# Patient Record
Sex: FEMALE | Race: WHITE | NOT HISPANIC OR LATINO | URBAN - METROPOLITAN AREA
[De-identification: names, ages, dates, MRNs, and addresses within clinical notes are randomized per-mention and may not be internally consistent; named-entity substitution may affect disease eponyms.]

---

## 2017-01-05 ENCOUNTER — EMERGENCY (EMERGENCY)
Facility: HOSPITAL | Age: 82
LOS: 1 days | Discharge: HOME | End: 2017-01-05
Admitting: EMERGENCY MEDICINE
Payer: MEDICARE

## 2017-01-05 DIAGNOSIS — R51 HEADACHE: ICD-10-CM

## 2017-01-05 PROCEDURE — 99284 EMERGENCY DEPT VISIT MOD MDM: CPT

## 2017-01-05 PROCEDURE — 70450 CT HEAD/BRAIN W/O DYE: CPT | Mod: 26

## 2017-01-05 PROCEDURE — 93010 ELECTROCARDIOGRAM REPORT: CPT

## 2017-06-24 ENCOUNTER — INPATIENT (INPATIENT)
Facility: HOSPITAL | Age: 82
LOS: 4 days | Discharge: NURSING, REHAB OR LONG-TERM CARE | End: 2017-06-29
Attending: FAMILY MEDICINE | Admitting: INTERNAL MEDICINE
Payer: MEDICARE

## 2017-06-24 VITALS
RESPIRATION RATE: 16 BRPM | TEMPERATURE: 98 F | HEIGHT: 60 IN | SYSTOLIC BLOOD PRESSURE: 101 MMHG | DIASTOLIC BLOOD PRESSURE: 51 MMHG | HEART RATE: 32 BPM | WEIGHT: 91.93 LBS | OXYGEN SATURATION: 100 %

## 2017-06-24 LAB
ALBUMIN SERPL ELPH-MCNC: 3.6 G/DL — SIGNIFICANT CHANGE UP (ref 3.3–5)
ALP SERPL-CCNC: 90 U/L — SIGNIFICANT CHANGE UP (ref 40–120)
ALT FLD-CCNC: 55 U/L — SIGNIFICANT CHANGE UP (ref 12–78)
ANION GAP SERPL CALC-SCNC: 8 MMOL/L — SIGNIFICANT CHANGE UP (ref 5–17)
AST SERPL-CCNC: 65 U/L — HIGH (ref 15–37)
BASOPHILS # BLD AUTO: 0 K/UL — SIGNIFICANT CHANGE UP (ref 0–0.2)
BASOPHILS NFR BLD AUTO: 0.3 % — SIGNIFICANT CHANGE UP (ref 0–2)
BILIRUB SERPL-MCNC: 0.6 MG/DL — SIGNIFICANT CHANGE UP (ref 0.2–1.2)
BUN SERPL-MCNC: 25 MG/DL — HIGH (ref 7–23)
CALCIUM SERPL-MCNC: 8.8 MG/DL — SIGNIFICANT CHANGE UP (ref 8.5–10.1)
CHLORIDE SERPL-SCNC: 106 MMOL/L — SIGNIFICANT CHANGE UP (ref 96–108)
CO2 SERPL-SCNC: 25 MMOL/L — SIGNIFICANT CHANGE UP (ref 22–31)
CREAT SERPL-MCNC: 1.01 MG/DL — SIGNIFICANT CHANGE UP (ref 0.5–1.3)
EOSINOPHIL # BLD AUTO: 0 K/UL — SIGNIFICANT CHANGE UP (ref 0–0.5)
EOSINOPHIL NFR BLD AUTO: 0.3 % — SIGNIFICANT CHANGE UP (ref 0–6)
GLUCOSE SERPL-MCNC: 172 MG/DL — HIGH (ref 70–99)
HCT VFR BLD CALC: 40 % — SIGNIFICANT CHANGE UP (ref 34.5–45)
HGB BLD-MCNC: 13.7 G/DL — SIGNIFICANT CHANGE UP (ref 11.5–15.5)
LYMPHOCYTES # BLD AUTO: 1.2 K/UL — SIGNIFICANT CHANGE UP (ref 1–3.3)
LYMPHOCYTES # BLD AUTO: 8.5 % — LOW (ref 13–44)
MAGNESIUM SERPL-MCNC: 2.2 MG/DL — SIGNIFICANT CHANGE UP (ref 1.6–2.6)
MCHC RBC-ENTMCNC: 31.8 PG — SIGNIFICANT CHANGE UP (ref 27–34)
MCHC RBC-ENTMCNC: 34.2 GM/DL — SIGNIFICANT CHANGE UP (ref 32–36)
MCV RBC AUTO: 93 FL — SIGNIFICANT CHANGE UP (ref 80–100)
MONOCYTES # BLD AUTO: 0.7 K/UL — SIGNIFICANT CHANGE UP (ref 0–0.9)
MONOCYTES NFR BLD AUTO: 5.3 % — SIGNIFICANT CHANGE UP (ref 2–14)
NEUTROPHILS # BLD AUTO: 11.8 K/UL — HIGH (ref 1.8–7.4)
NEUTROPHILS NFR BLD AUTO: 85.6 % — HIGH (ref 43–77)
NT-PROBNP SERPL-SCNC: 958 PG/ML — HIGH (ref 0–450)
PLATELET # BLD AUTO: 196 K/UL — SIGNIFICANT CHANGE UP (ref 150–400)
POTASSIUM SERPL-MCNC: 4.2 MMOL/L — SIGNIFICANT CHANGE UP (ref 3.5–5.3)
POTASSIUM SERPL-SCNC: 4.2 MMOL/L — SIGNIFICANT CHANGE UP (ref 3.5–5.3)
PROT SERPL-MCNC: 6.6 GM/DL — SIGNIFICANT CHANGE UP (ref 6–8.3)
RBC # BLD: 4.3 M/UL — SIGNIFICANT CHANGE UP (ref 3.8–5.2)
RBC # FLD: 12.6 % — SIGNIFICANT CHANGE UP (ref 10.3–14.5)
SODIUM SERPL-SCNC: 139 MMOL/L — SIGNIFICANT CHANGE UP (ref 135–145)
WBC # BLD: 13.8 K/UL — HIGH (ref 3.8–10.5)
WBC # FLD AUTO: 13.8 K/UL — HIGH (ref 3.8–10.5)

## 2017-06-24 PROCEDURE — 72125 CT NECK SPINE W/O DYE: CPT | Mod: 26

## 2017-06-24 PROCEDURE — 72100 X-RAY EXAM L-S SPINE 2/3 VWS: CPT | Mod: 26

## 2017-06-24 PROCEDURE — 70450 CT HEAD/BRAIN W/O DYE: CPT | Mod: 26

## 2017-06-24 PROCEDURE — 71010: CPT | Mod: 26

## 2017-06-24 PROCEDURE — 72070 X-RAY EXAM THORAC SPINE 2VWS: CPT | Mod: 26

## 2017-06-24 PROCEDURE — 93010 ELECTROCARDIOGRAM REPORT: CPT

## 2017-06-24 PROCEDURE — 99285 EMERGENCY DEPT VISIT HI MDM: CPT

## 2017-06-24 RX ORDER — ATROPINE SULFATE 0.1 MG/ML
0.5 SYRINGE (ML) INJECTION ONCE
Qty: 0 | Refills: 0 | Status: COMPLETED | OUTPATIENT
Start: 2017-06-24 | End: 2017-06-24

## 2017-06-24 RX ORDER — SODIUM CHLORIDE 9 MG/ML
500 INJECTION INTRAMUSCULAR; INTRAVENOUS; SUBCUTANEOUS ONCE
Qty: 0 | Refills: 0 | Status: COMPLETED | OUTPATIENT
Start: 2017-06-24 | End: 2017-06-24

## 2017-06-24 RX ADMIN — Medication 0.5 MILLIGRAM(S): at 22:26

## 2017-06-24 RX ADMIN — SODIUM CHLORIDE 500 MILLILITER(S): 9 INJECTION INTRAMUSCULAR; INTRAVENOUS; SUBCUTANEOUS at 21:00

## 2017-06-24 NOTE — ED PROVIDER NOTE - CARE PLAN
Principal Discharge DX:	Thoracic compression fracture, closed, initial encounter  Secondary Diagnosis:	Syncope

## 2017-06-24 NOTE — ED PROVIDER NOTE - OBJECTIVE STATEMENT
84 y/o female pt w/ hx of HTN presents to the ED s/p syncope. Pt states that she was getting ready for supper and fell down secondary to syncope as she went to turn off the stove. Pt experiences aching upper back pain due to falling onto the kitchen floor.  Pt mentions that she has left bundle block and takes Atenolol. Reports that she "feels heart beating fast sometimes." Pt has no other complaints and denies SOB and CP.

## 2017-06-24 NOTE — ED PROVIDER NOTE - MEDICAL DECISION MAKING DETAILS
Spoke with Dr. Link, states pt's wishes clear with DNR/DNI, no procedures/surgeries.  Will let Dr. Leggett know.  Attempted 0.5 mg atropine without improvement in HR.  Discussed again patient's heart block, and she still refuses intervention for this, and has signs a DNR form.  Pt's labs demonstrate elevated CK, but normal troponin, elevated BNP.  CT head and C spine WNL, but shows T3 mild anterior wedge deformity.  Will admit patient for further palliative care. Spoke with Dr. Link, states pt's wishes clear with DNR/DNI, no procedures/surgeries.  Will let Dr. Leggett know.  Attempted 0.5 mg atropine without improvement in HR.  Discussed again patient's heart block, and she still refuses intervention for this, and has signed a DNR form.  Pt's labs demonstrate elevated CK, but normal troponin, elevated BNP.  CT head and C spine WNL, but shows T3 mild anterior wedge deformity.  Will admit patient for further palliative care.

## 2017-06-24 NOTE — ED ADULT TRIAGE NOTE - CHIEF COMPLAINT QUOTE
unwitnessed syncopal episode, c/o lower back pain. Denies blood thinners, CP, SOB, dizziness at this time

## 2017-06-24 NOTE — ED PROVIDER NOTE - PROGRESS NOTE DETAILS
Slime Díaz: Pt requesting DNR. Spoke with Dr. Link, states pt's wishes clear with DNR/DNI, no procedures/surgeries.  Will let Dr. Leggett know.  Attempted 0.5 mg atropine without improvement in HR.  Discussed again patient's heart block, and she still refuses intervention for this, and has signs a DNR form.  Pt's labs demonstrate elevated CK, but normal troponin, elevated BNP.  CT head and C spine WNL, but shows T3 mild anterior wedge deformity.  Will admit patient for further palliative care.

## 2017-06-24 NOTE — ED PROVIDER NOTE - CONSTITUTIONAL, MLM
normal... Elderly white female, Well appearing, well nourished, awake, alert, oriented to person, place, time/situation and in no apparent distress.

## 2017-06-25 DIAGNOSIS — Z90.49 ACQUIRED ABSENCE OF OTHER SPECIFIED PARTS OF DIGESTIVE TRACT: Chronic | ICD-10-CM

## 2017-06-25 DIAGNOSIS — R55 SYNCOPE AND COLLAPSE: ICD-10-CM

## 2017-06-25 LAB
ANION GAP SERPL CALC-SCNC: 11 MMOL/L — SIGNIFICANT CHANGE UP (ref 5–17)
ANION GAP SERPL CALC-SCNC: 9 MMOL/L — SIGNIFICANT CHANGE UP (ref 5–17)
BASE EXCESS BLDA CALC-SCNC: -4 MMOL/L — LOW (ref -2–2)
BASE EXCESS BLDA CALC-SCNC: -7 MMOL/L — LOW (ref -2–2)
BASOPHILS # BLD AUTO: 0 K/UL — SIGNIFICANT CHANGE UP (ref 0–0.2)
BASOPHILS NFR BLD AUTO: 0.2 % — SIGNIFICANT CHANGE UP (ref 0–2)
BLOOD GAS COMMENTS ARTERIAL: SIGNIFICANT CHANGE UP
BLOOD GAS COMMENTS ARTERIAL: SIGNIFICANT CHANGE UP
BUN SERPL-MCNC: 32 MG/DL — HIGH (ref 7–23)
BUN SERPL-MCNC: 41 MG/DL — HIGH (ref 7–23)
CALCIUM SERPL-MCNC: 8.1 MG/DL — LOW (ref 8.5–10.1)
CALCIUM SERPL-MCNC: 8.5 MG/DL — SIGNIFICANT CHANGE UP (ref 8.5–10.1)
CHLORIDE SERPL-SCNC: 107 MMOL/L — SIGNIFICANT CHANGE UP (ref 96–108)
CHLORIDE SERPL-SCNC: 108 MMOL/L — SIGNIFICANT CHANGE UP (ref 96–108)
CK SERPL-CCNC: 174 U/L — SIGNIFICANT CHANGE UP (ref 26–192)
CK SERPL-CCNC: 266 U/L — HIGH (ref 26–192)
CO2 SERPL-SCNC: 21 MMOL/L — LOW (ref 22–31)
CO2 SERPL-SCNC: 25 MMOL/L — SIGNIFICANT CHANGE UP (ref 22–31)
CREAT SERPL-MCNC: 1.37 MG/DL — HIGH (ref 0.5–1.3)
CREAT SERPL-MCNC: 1.74 MG/DL — HIGH (ref 0.5–1.3)
EOSINOPHIL # BLD AUTO: 0 K/UL — SIGNIFICANT CHANGE UP (ref 0–0.5)
EOSINOPHIL NFR BLD AUTO: 0 % — SIGNIFICANT CHANGE UP (ref 0–6)
GAS PNL BLDA: SIGNIFICANT CHANGE UP
GLUCOSE SERPL-MCNC: 148 MG/DL — HIGH (ref 70–99)
GLUCOSE SERPL-MCNC: 207 MG/DL — HIGH (ref 70–99)
HCO3 BLDA-SCNC: 17 MMOL/L — LOW (ref 21–29)
HCO3 BLDA-SCNC: 20 MMOL/L — LOW (ref 21–29)
HCT VFR BLD CALC: 35.8 % — SIGNIFICANT CHANGE UP (ref 34.5–45)
HCT VFR BLD CALC: 38.2 % — SIGNIFICANT CHANGE UP (ref 34.5–45)
HGB BLD-MCNC: 12.6 G/DL — SIGNIFICANT CHANGE UP (ref 11.5–15.5)
HGB BLD-MCNC: 13.2 G/DL — SIGNIFICANT CHANGE UP (ref 11.5–15.5)
LACTATE SERPL-SCNC: 3.6 MMOL/L — HIGH (ref 0.7–2)
LYMPHOCYTES # BLD AUTO: 1.1 K/UL — SIGNIFICANT CHANGE UP (ref 1–3.3)
LYMPHOCYTES # BLD AUTO: 9.4 % — LOW (ref 13–44)
MAGNESIUM SERPL-MCNC: 2.2 MG/DL — SIGNIFICANT CHANGE UP (ref 1.6–2.6)
MCHC RBC-ENTMCNC: 32.4 PG — SIGNIFICANT CHANGE UP (ref 27–34)
MCHC RBC-ENTMCNC: 32.8 PG — SIGNIFICANT CHANGE UP (ref 27–34)
MCHC RBC-ENTMCNC: 34.7 GM/DL — SIGNIFICANT CHANGE UP (ref 32–36)
MCHC RBC-ENTMCNC: 35.3 GM/DL — SIGNIFICANT CHANGE UP (ref 32–36)
MCV RBC AUTO: 93 FL — SIGNIFICANT CHANGE UP (ref 80–100)
MCV RBC AUTO: 93.3 FL — SIGNIFICANT CHANGE UP (ref 80–100)
MONOCYTES # BLD AUTO: 0.5 K/UL — SIGNIFICANT CHANGE UP (ref 0–0.9)
MONOCYTES NFR BLD AUTO: 4.5 % — SIGNIFICANT CHANGE UP (ref 2–14)
NEUTROPHILS # BLD AUTO: 9.9 K/UL — HIGH (ref 1.8–7.4)
NEUTROPHILS NFR BLD AUTO: 85.9 % — HIGH (ref 43–77)
PCO2 BLDA: 30 MMHG — LOW (ref 32–46)
PCO2 BLDA: 36 MMHG — SIGNIFICANT CHANGE UP (ref 32–46)
PH BLDA: 7.37 — SIGNIFICANT CHANGE UP (ref 7.35–7.45)
PH BLDA: 7.37 — SIGNIFICANT CHANGE UP (ref 7.35–7.45)
PLATELET # BLD AUTO: 145 K/UL — LOW (ref 150–400)
PLATELET # BLD AUTO: 177 K/UL — SIGNIFICANT CHANGE UP (ref 150–400)
PO2 BLDA: 78 — SIGNIFICANT CHANGE UP
PO2 BLDA: 80 — SIGNIFICANT CHANGE UP
POTASSIUM SERPL-MCNC: 4.2 MMOL/L — SIGNIFICANT CHANGE UP (ref 3.5–5.3)
POTASSIUM SERPL-MCNC: 4.4 MMOL/L — SIGNIFICANT CHANGE UP (ref 3.5–5.3)
POTASSIUM SERPL-SCNC: 4.2 MMOL/L — SIGNIFICANT CHANGE UP (ref 3.5–5.3)
POTASSIUM SERPL-SCNC: 4.4 MMOL/L — SIGNIFICANT CHANGE UP (ref 3.5–5.3)
RBC # BLD: 3.85 M/UL — SIGNIFICANT CHANGE UP (ref 3.8–5.2)
RBC # BLD: 4.09 M/UL — SIGNIFICANT CHANGE UP (ref 3.8–5.2)
RBC # FLD: 12.5 % — SIGNIFICANT CHANGE UP (ref 10.3–14.5)
RBC # FLD: 12.8 % — SIGNIFICANT CHANGE UP (ref 10.3–14.5)
SAO2 % BLDA: 95 — SIGNIFICANT CHANGE UP
SAO2 % BLDA: 95 — SIGNIFICANT CHANGE UP
SODIUM SERPL-SCNC: 140 MMOL/L — SIGNIFICANT CHANGE UP (ref 135–145)
SODIUM SERPL-SCNC: 141 MMOL/L — SIGNIFICANT CHANGE UP (ref 135–145)
TROPONIN I SERPL-MCNC: 0.02 NG/ML — SIGNIFICANT CHANGE UP (ref 0.01–0.04)
WBC # BLD: 11.5 K/UL — HIGH (ref 3.8–10.5)
WBC # BLD: 12.5 K/UL — HIGH (ref 3.8–10.5)
WBC # FLD AUTO: 11.5 K/UL — HIGH (ref 3.8–10.5)
WBC # FLD AUTO: 12.5 K/UL — HIGH (ref 3.8–10.5)

## 2017-06-25 PROCEDURE — 99223 1ST HOSP IP/OBS HIGH 75: CPT | Mod: 57

## 2017-06-25 PROCEDURE — 33210 INSERT ELECTRD/PM CATH SNGL: CPT

## 2017-06-25 RX ORDER — ONDANSETRON 8 MG/1
8 TABLET, FILM COATED ORAL ONCE
Qty: 0 | Refills: 0 | Status: COMPLETED | OUTPATIENT
Start: 2017-06-25 | End: 2017-06-25

## 2017-06-25 RX ORDER — GLUCAGON INJECTION, SOLUTION 0.5 MG/.1ML
0.05 INJECTION, SOLUTION SUBCUTANEOUS
Qty: 5 | Refills: 0 | Status: DISCONTINUED | OUTPATIENT
Start: 2017-06-25 | End: 2017-06-25

## 2017-06-25 RX ORDER — GLUCAGON INJECTION, SOLUTION 0.5 MG/.1ML
5 INJECTION, SOLUTION SUBCUTANEOUS ONCE
Qty: 0 | Refills: 0 | Status: COMPLETED | OUTPATIENT
Start: 2017-06-25 | End: 2017-06-25

## 2017-06-25 RX ORDER — VANCOMYCIN HCL 1 G
500 VIAL (EA) INTRAVENOUS ONCE
Qty: 0 | Refills: 0 | Status: COMPLETED | OUTPATIENT
Start: 2017-06-25 | End: 2017-06-25

## 2017-06-25 RX ORDER — CALCIUM GLUCONATE 100 MG/ML
1 VIAL (ML) INTRAVENOUS ONCE
Qty: 1 | Refills: 0 | Status: COMPLETED | OUTPATIENT
Start: 2017-06-25 | End: 2017-06-25

## 2017-06-25 RX ORDER — SODIUM CHLORIDE 9 MG/ML
1000 INJECTION INTRAMUSCULAR; INTRAVENOUS; SUBCUTANEOUS
Qty: 0 | Refills: 0 | Status: DISCONTINUED | OUTPATIENT
Start: 2017-06-25 | End: 2017-06-27

## 2017-06-25 RX ADMIN — GLUCAGON INJECTION, SOLUTION 20.85 MG/KG/HR: 0.5 INJECTION, SOLUTION SUBCUTANEOUS at 06:47

## 2017-06-25 RX ADMIN — ONDANSETRON 108 MILLIGRAM(S): 8 TABLET, FILM COATED ORAL at 06:01

## 2017-06-25 RX ADMIN — Medication 100 MILLIGRAM(S): at 16:32

## 2017-06-25 RX ADMIN — Medication 0.5 MILLIGRAM(S): at 16:32

## 2017-06-25 RX ADMIN — SODIUM CHLORIDE 100 MILLILITER(S): 9 INJECTION INTRAMUSCULAR; INTRAVENOUS; SUBCUTANEOUS at 21:43

## 2017-06-25 RX ADMIN — GLUCAGON INJECTION, SOLUTION 5 MILLIGRAM(S): 0.5 INJECTION, SOLUTION SUBCUTANEOUS at 06:01

## 2017-06-25 RX ADMIN — Medication 200 GRAM(S): at 06:10

## 2017-06-25 NOTE — PROGRESS NOTE ADULT - SUBJECTIVE AND OBJECTIVE BOX
syncope.    85F.  lives alone.  preparing dinner and passed out while walking to stove.  in ED found to be in CHB.  given atropine.  takes atenolol.  reversal glucagon gtt + calcium gluconate.  patietnt deferred TVP.    at current time, patient asymptomatic.  readdressed option for TVP and PPM.  willing to think about it.      she's highly functioning at home.  lives alone w/ help from the community.  has 2 daughters, one in [facility] and the other lives in [place].    ROS:  (-) CP.  (-) palpitations.    frail, elderly wf.  no acute distress.  appears comfortable.  neck nml JVP.  lungs CTA.  no w/r/r.  heart regular.  NS1S2.  no m/r/g.  abd soft.  NTND.  no guarding.  no rebound.  ext no pedal edema.  A&Ox3.    MEDICATIONS  (STANDING):  glucagon Infusion 0.05mG/kG/Hr IV Continuous <Continuous>    MEDICATIONS  (PRN):    Vital Signs Last 24 Hrs  T(C): 36.6, Max: 36.7 (06-25 @ 04:27)  T(F): 97.9, Max: 98.1 (06-25 @ 04:27)  HR: 31 (25 - 71)  BP: 112/40 (101/51 - 141/57)  BP(mean): --  RR: 18 (16 - 18)  SpO2: 100% (100% - 100%)                        13.2   11.5  )-----------( 177      ( 25 Jun 2017 05:25 )             38.2       06-25    141  |  107  |  32<H>  ----------------------------<  148<H>  4.4   |  25  |  1.37<H>    Ca    8.5      25 Jun 2017 05:24  Mg     2.2     06-25    TPro  6.6  /  Alb  3.6  /  TBili  0.6  /  DBili  x   /  AST  65<H>  /  ALT  55  /  AlkPhos  90  06-24    CARDIAC MARKERS ( 25 Jun 2017 05:24 )  x     / x     / 174 U/L / x     / x      CARDIAC MARKERS ( 24 Jun 2017 23:32 )  0.016 ng/mL / x     / x     / x     / x      CARDIAC MARKERS ( 24 Jun 2017 20:48 )  <0.015 ng/mL / x     / 207 U/L / x     / x        syncope secondary to CHB.  -admit to IS.  -patient considering options but has not committed to a decision yet.  amenable to speaking w/ Cardiology and EP, re:  TVP to PPM.  -TTE.  -c/w glucagon gtt.  -d/c all AVNBs.  -Cardiology + EP consults.    renal insufficiency.  -given IVFs in ED.  -f/u AM BMP.    DOROTEO calcification.  -nonemergent chest CT recommended.      T3 vertebral body wedging.  -consider orthopedic spine follow up outpatient.     disposition.  -admit to IS.  -to be determined.

## 2017-06-25 NOTE — H&P ADULT - NSHPREVIEWOFSYSTEMS_GEN_ALL_CORE
REVIEW OF SYSTEMS:    CONSTITUTIONAL: + weakness  EYES/ENT:+ vertigo   RESPIRATORY: No cough, wheezing, hemoptysis; No shortness of breath  CARDIOVASCULAR: No chest pain or palpitations  GASTROINTESTINAL: No abdominal or epigastric pain. No nausea, vomiting, or hematemesis; No diarrhea or constipation. No melena or hematochezia.  GENITOURINARY: No dysuria, frequency or hematuria  SKIN: No itching, burning, rashes, or lesions   All other review of systems is negative unless indicated above.

## 2017-06-25 NOTE — GOALS OF CARE CONVERSATION - PERSONAL ADVANCE DIRECTIVE - CONVERSATION DETAILS
The role of Palliative medicine was reviewed. The pt discussed her concerns regarding Pacemeake placement and has decided that she will proceed based on quality of life issues. She will discuss further with her daughter at her request

## 2017-06-25 NOTE — H&P ADULT - NSHPLABSRESULTS_GEN_ALL_CORE
13.7   13.8  )-----------( 196      ( 24 Jun 2017 20:48 )             40.0     06-24    139  |  106  |  25<H>  ----------------------------<  172<H>  4.2   |  25  |  1.01    Ca    8.8      24 Jun 2017 20:48  Mg     2.2     06-24    TPro  6.6  /  Alb  3.6  /  TBili  0.6  /  DBili  x   /  AST  65<H>  /  ALT  55  /  AlkPhos  90  06-24    CARDIAC MARKERS ( 24 Jun 2017 23:32 )  0.016 ng/mL / x     / x     / x     / x      CARDIAC MARKERS ( 24 Jun 2017 20:48 )  <0.015 ng/mL / x     / 207 U/L / x     / x          LIVER FUNCTIONS - ( 24 Jun 2017 20:48 )  Alb: 3.6 g/dL / Pro: 6.6 gm/dL / ALK PHOS: 90 U/L / ALT: 55 U/L / AST: 65 U/L / GGT: x

## 2017-06-25 NOTE — CONSULT NOTE ADULT - SUBJECTIVE AND OBJECTIVE BOX
HPI: Pt is a 85y old Female with hx of a LBBB and HTN who presents to ED after a syncopal episode. The patient notes that she was preparing dinner and as she was walking to the stove she passed out. In the ED, the patient was noted to be in complete heart block and received 0.5mg of atropine. She was advised to proceed with TVP yet she adamantly refused. She is currently in the CICU with tele monitoring and we are called for GOC  6/25/17 Seen and examined at bedside in NAD. Denies pain or dyspnea. Considering PPM placement. At the time of my visit she was seen by Dr Camacho and was able to discuss benefit and burden to PPM placement.         PAIN: ( )Yes   ( X)No  Level:  Location:  Intensity:    /10  Quality:  Aggravating Factors:  Alleviating Factors:  Radiation:  Duration/Timing:  Impact on ADLs:    DYSPNEA: ( ) Yes  (X ) No  Level:    PAST MEDICAL & SURGICAL HISTORY:  History of appendectomy        SOCIAL HX:  Lives alone X 15 yrs  2 daughters 1 on LI    Hx opiate tolerance ( )YES  ( X)NO    Baseline ADLs  (Prior to Admission)  ( X) Independent   ( )Dependent    FAMILY HISTORY:  No pertinent family history in first degree relatives      Review of Systems:    Anxiety-mild  Depression-  Physical Discomfort-denies  Dyspnea-denies  Constipation-denies  Diarrhea-  Nausea-denies  Vomiting-  Anorexia-  Weight Loss-   Cough-  Secretions-  Fatigue-mild  Weakness-  Delirium-none    All other systems reviewed and negative  Unable to obtain/Limited due to:      PHYSICAL EXAM:    Vital Signs Last 24 Hrs  T(C): 36.6, Max: 36.7 (06-25 @ 04:27)  T(F): 97.9, Max: 98.1 (06-25 @ 04:27)  HR: 31 (25 - 71)  BP: 112/40 (101/51 - 141/57)  BP(mean): --  RR: 18 (16 - 18)  SpO2: 100% (100% - 100%)  Daily Height in cm: 152.4 (24 Jun 2017 20:15)    Daily     PPSV2:  50 %  FAST:    General: awake and alert female in bed in NAD  Mental Status: A&O x 3  HEENT: Oral mucosa dry  Lungs: Clear to aus Rivas  Cardiac: S1S2+ Bradycardic  GI: Abd soft NT ND + BS  : Voids  Ext:BECKMAN=strength  Neuro: no focal def      LABS:                        13.2   11.5  )-----------( 177      ( 25 Jun 2017 05:25 )             38.2     06-25    141  |  107  |  32<H>  ----------------------------<  148<H>  4.4   |  25  |  1.37<H>    Ca    8.5      25 Jun 2017 05:24  Mg     2.2     06-25    TPro  6.6  /  Alb  3.6  /  TBili  0.6  /  DBili  x   /  AST  65<H>  /  ALT  55  /  AlkPhos  90  06-24      Albumin: Albumin, Serum: 3.6 g/dL (06-24 @ 20:48)      Allergies    penicillin (Rash)  timolol ophthalmic (Rash)    Intolerances      MEDICATIONS  (STANDING):  glucagon Infusion 0.05mG/kG/Hr IV Continuous <Continuous>    MEDICATIONS  (PRN):      RADIOLOGY/ADDITIONAL STUDIES:    EXAM:  CHEST SINGLE VIEW FRONTAL                            PROCEDURE DATE:  06/24/2017        INTERPRETATION:  Exam Date: 6/24/2017 11:26 PM    History: Syncope with back pain    Technique: Single frontal portable view of the chest with comparisonto    9/5/2011    Findings:    The heart is normal in size.  The lungs are grossly clear. The apices and   hemidiaphragms are unremarkable. Degenerative changes of the visualized   osseous structures.        Impression:    No acute disease    No significant interval change as compared to  9/5/2011    EXAM:  LUMBAR SPINE                            PROCEDURE DATE:  06/24/2017        INTERPRETATION:  Exam Date: 6/24/2017 11:26 PM  Clinical Information: Fall with back pain  Technique: 3 views of the lumbar spine and 2 views of the thoracic spine     Findings:    Thoracolumbar scoliosis. Diffuse severe degenerative changes. No definite   acute appearing fracture. No definite subluxation.    Impression:    No fracture or subluxation

## 2017-06-25 NOTE — H&P ADULT - ASSESSMENT
This is a 85 year old female admitted for 3rd Degree Heart block This is a 85 year old female admitted for 3rd Degree Heart block now presently refusing any intervention     Cardio: 3rd Degree heart Block   discontinue AV tristan blocking agents ( Atenolol)  hold antihypetensives   will give Calcium gluconate   Patient is refusing any invasive measures for pacing - during this visit it was addressed multiple times     Palliative Care Consultation in the AM. This is a 85 year old female admitted for 3rd Degree Heart block now presently refusing any intervention     Cardio: 3rd Degree heart Block   Given patients refusal to pacing measures will empirically treat for BB overdose as patient has a CrCL of 26 and Atenolol is exclusively renally cleared   Zofran 8mg IVPB prior to glucagon   Will give glucagon bolus + initiation of glucagon ggt at 3  will give Calcium gluconate   discontinue AV tristan blocking agents ( Atenolol)  Cardiology consult in AM Dr. Mullen   hold antihypetensives     Patient is refusing any invasive measures for pacing - during this visit it was addressed multiple times     Renal: CKD CrCL 26  - Patient recieved 1L NS in ED     Elevated CPK  likely secondary to fall       Palliative Care Consultation in the AM.     DNR/DNI This is a 85 year old female admitted for 3rd Degree Heart block now presently refusing any intervention     Cardio: 3rd Degree heart Block   Given patients refusal to pacing measures will empirically treat for BB overdose as patient has a CrCL of 26 and Atenolol is exclusively renally cleared   If unsuccessful with medical managment then will proceed with palliative measures. Treatment of BB is considered reversible and hence will perform trial as initial attempt in ED with Atropine was unsuccessful.   Zofran 8mg IVPB prior to glucagon   Will give glucagon bolus + initiation of glucagon ggt at 3  will give Calcium gluconate   discontinue AV tristan blocking agents ( Atenolol)  Cardiology consult in AM Dr. Mullen   hold antihypetensives     Patient is refusing any invasive measures for pacing - during this visit it was addressed multiple times     Renal: CKD CrCL 26  - Patient recieved 1L NS in ED     Elevated CPK  likely secondary to fall       Palliative Care Consultation in the AM.     DNR/DNI

## 2017-06-25 NOTE — PROGRESS NOTE ADULT - ASSESSMENT
85y old F with:  Acute Symptomatic Bradycardia  Third Degree Heart Block  LBBB    Plan:  Transfer to CCU  Low BP / Low HR - Atropine given  Start Dopamine gtt  Will need TVP - Dr. Phillip aware  NPO / IVF  Monitor renal function  Strict I/O's  DVT prophylaxis - Hep SQ

## 2017-06-25 NOTE — CONSULT NOTE ADULT - ASSESSMENT
Pt is a 85y old Female with hx of a LBBB and HTN who presents to ED after a syncopal episode. The patient notes that she was preparing dinner and as she was walking to the stove she passed out. In the ED, the patient was noted to be in complete heart block and received 0.5mg of atropine. She was advised to proceed with TVP yet she adamantly refused. She is currently in the CICU with tele monitoring and we are called for C  Assessment and Plan:  1) Syncopal episode  -R/T Complete Heart block  -EP eval noted  -Pt considering PPM placement  -Cont tele monitoring  2) S/P fall  -R/T heart block  -Spine xRays=neg  -Head CT neg  3) Advance directives  -Pt with capacity  -DNR/DNI complete  -Declines a meeting with daughter at this time  -Pt is strongly considering PPM placement as she feels it will allow an improved quality of life.    -Will follow

## 2017-06-25 NOTE — ED ADULT NURSE REASSESSMENT NOTE - NS ED NURSE REASSESS COMMENT FT1
MD Rebolledo notified regarding pt BP. pt request to leave at this time
Pt report given to floor and transport called. MD to bedside and pt transport after hospitalist evaluation.
pt resting comfortably during morning rounds. made aware of admission status

## 2017-06-25 NOTE — CONSULT NOTE ADULT - SUBJECTIVE AND OBJECTIVE BOX
This is an 85 year old female with a history of a LBBB, hypertension who presents to ED after recent syncopal episode. The patient notes that she was preparing dinner and as she was walking to the stove she passed out. She was able to crawl to the phone and call for help. The patient denied any chest pain. She did not that she had experienced palpitations and an odd sensation in her chest prior to the episode. She notes that her atenolol dose has been decreased over the last few weeks.   In the ED, the patient was noted to be in complete heart block and recieved 0.5mg of atropine. In the ED The patient was advised to proceed with TVP yet she adamantly refused. During my personal encounter with the patient, she was AAOx3 and once again refused any pacing mechanism. She stated that she was 85 and did not want anything else and wanted to go quickly if she was going to die.     She now has been transferred to the telemetry maya and is reconsidering a permanent pacemaker placement.    Review of Systems:  Review of Systems: REVIEW OF SYSTEMS:  CONSTITUTIONAL: + weakness EYES/ENT:+ vertigo  RESPIRATORY: No cough, wheezing, hemoptysis; No shortness of breath CARDIOVASCULAR: No chest pain or palpitations GASTROINTESTINAL: No abdominal or epigastric pain. No nausea, vomiting, or hematemesis; No diarrhea or constipation. No melena or hematochezia. GENITOURINARY: No dysuria, frequency or hematuria SKIN: No itching, burning, rashes, or lesions  All other review of systems is negative unless indicated above.	        Allergies and Intolerances:        Allergies:  	penicillin: Drug, Rash  	timolol ophthalmic: Drug, Rash    Home Medications:   * Patient Currently Takes Medications as of 25-Jun-2017 04:23 documented in Prescription Writer  · 	atenolol 50 mg oral tablet:  orally 2 times a day, Last Dose Taken:    · 	Avapro 75 mg oral tablet: 3 tab(s) orally once a day, Last Dose Taken:      . .    Patient History:   Past Medical History:  HTN (hypertension).    Past Surgical History:  History of appendectomy.    Family History:  No pertinent family history in first degree relatives.    Social History:  Social History (marital status, living situation, occupation, tobacco use, alcohol and drug use, and sexual history): NO tobacco use  No Etoh use	    Tobacco Screening:  · Core Measure Site	No	  · Has the patient used tobacco in the past 30 days?	No	    Risk Assessment:   Present on Admission:  Deep Venous Thrombosis	no	  Pulmonary Embolus	no	  Urinary Catheter	no	  Central Venous Catheter/PICC Line	no	  Surgical Site Incision	no	  Pressure Ulcer(s)	no	    Heart Failure:  Does this patient have a history of or has been diagnosed with heart failure? no.      Physical Exam:  Physical Exam: PHYSICAL EXAM:   Constitutional: NAD, cachetic frail female  HEENT: PERRLA, EOMI, Normal Hearing, MMM Back: Normal spine flexure, No CVA tenderness Respiratory: CTAB Cardiovascular: S1 and S2, Bradycardic  Gastrointestinal: BS+, soft, NT/ND Extremities: No peripheral edema Vascular: 2+ peripheral pulses Neurological: A/O x 3, no focal deficits Musculoskeletal: 5/5 strength b/l upper and lower extremities	      Labs and Results:  Labs, Radiology, Cardiology, and Other Results: 13.7  13.8  )-----------( 196      ( 24 Jun 2017 20:48 )            40.0   06-24  139  |  106  |  25<H> ----------------------------<  172<H> 4.2   |  25  |  1.01  Ca    8.8      24 Jun 2017 20:48 Mg     2.2     06-24  TPro  6.6  /  Alb  3.6  /  TBili  0.6  /  DBili  x   /  AST  65<H>  /  ALT  55  /  AlkPhos  90  06-24  CARDIAC MARKERS ( 24 Jun 2017 23:32 ) 0.016 ng/mL / x     / x     / x     / x     CARDIAC MARKERS ( 24 Jun 2017 20:48 ) <0.015 ng/mL / x     / 207 U/L / x     / x       LIVER FUNCTIONS - ( 24 Jun 2017 20:48 ) Alb: 3.6 g/dL / Pro: 6.6 gm/dL / ALK PHOS: 90 U/L / ALT: 55 U/L / AST: 65 U/L / GGT: x

## 2017-06-25 NOTE — CONSULT NOTE ADULT - ASSESSMENT
85 yr old frail, cachectic female w/ complete heart block and a relatively narrow junctional escape at about 30 bpm who presented to ED after syncope

## 2017-06-25 NOTE — CONSULT NOTE ADULT - PROBLEM SELECTOR RECOMMENDATION 9
PPM was offered (?Micra due to frail status).  Patient considering options and does appear to be of sound mind to make her own medical decisions.  Await response.

## 2017-06-25 NOTE — PROGRESS NOTE ADULT - SUBJECTIVE AND OBJECTIVE BOX
CC: bradycardic    HPI:  This is an 85 year old female with a history of a LBBB, hypertension who presents to ED after recent syncopal episode. The patient notes that she was preparing dinner and as she was walking to the stove she passed out. She was able to crawl to the phone and call for help. The patient denied any chest pain. She did not that she had experienced palpitations and an odd sensation in her chest prior to the episode. She notes that her atenolol dose has been decreased over the last few weeks.   In the ED, the patient was noted to be in complete heart block and recieved 0.5mg of atropine. In the ED The patient was advised to proceed with TVP yet she adamantly refused. Dshe was AAOx3 and once again refused any pacing mechanism. She stated that she was 85 and did not want anything else and wanted to go quickly if she was going to die. I reinforced placement of a pacing mechanism several times and she was stead fast with her decision. She had agreed to DNR/DNI with the ED physician. (25 Jun 2017 04:13)    6/26 - Called to see patient for Rapid Response for symptomatic bradycardia.    PAST MEDICAL & SURGICAL HISTORY:  History of appendectomy      FAMILY HISTORY:  No pertinent family history in first degree relatives      Social Hx:    Allergies    penicillin (Rash)  timolol ophthalmic (Rash)    Intolerances        85y    Height (cm): 152.4 (06-24 @ 20:15)  Weight (kg): 41.7 (06-24 @ 20:15)  BMI (kg/m2): 18 (06-24 @ 20:15)    ICU Vital Signs Last 24 Hrs  T(C): 36.6, Max: 36.7 (06-25 @ 04:27)  T(F): 97.9, Max: 98.1 (06-25 @ 04:27)  HR: 25 (25 - 71)  BP: 77/56 (77/56 - 148/119)  BP(mean): 60 (60 - 127)  ABP: --  ABP(mean): --  RR: 14 (10 - 18)  SpO2: 100% (100% - 100%)          I&O's Summary                            13.2   11.5  )-----------( 177      ( 25 Jun 2017 05:25 )             38.2       06-25    141  |  107  |  32<H>  ----------------------------<  148<H>  4.4   |  25  |  1.37<H>    Ca    8.5      25 Jun 2017 05:24  Mg     2.2     06-25    TPro  6.6  /  Alb  3.6  /  TBili  0.6  /  DBili  x   /  AST  65<H>  /  ALT  55  /  AlkPhos  90  06-24      CAPILLARY BLOOD GLUCOSE      LIVER FUNCTIONS - ( 24 Jun 2017 20:48 )  Alb: 3.6 g/dL / Pro: 6.6 gm/dL / ALK PHOS: 90 U/L / ALT: 55 U/L / AST: 65 U/L / GGT: x             CARDIAC MARKERS ( 25 Jun 2017 13:44 )  x     / x     / 266 U/L / x     / x      CARDIAC MARKERS ( 25 Jun 2017 05:24 )  x     / x     / 174 U/L / x     / x      CARDIAC MARKERS ( 24 Jun 2017 23:32 )  0.016 ng/mL / x     / x     / x     / x      CARDIAC MARKERS ( 24 Jun 2017 20:48 )  <0.015 ng/mL / x     / 207 U/L / x     / x                        MEDICATIONS  (STANDING):    MEDICATIONS  (PRN):          DVT Prophylaxis:    Advanced Directives:  Discussed with:    Visit Information:    ** Time is exclusive of billed procedures and/or teaching and/or routine family updates.

## 2017-06-25 NOTE — H&P ADULT - HISTORY OF PRESENT ILLNESS
This is an 85 year old female with a history of a LBBB, hypertension who presents to ED after recent syncopal episode. The patient notes that she was preparing dinner and as she was walking to the stove she passed out. She was able to crawl to the phone and call for help. The patient denied any chest pain. She did not that she had experienced palpitations and an odd sensation in her chest prior to the episode. She notes that her atenolol dose has been decreased over the last few weeks.   In the ED, the patient was noted to be in complete heart block and recieved 0.5mg of atropine. In the ED The patient was advised to proceed with TVP yet she adamantly refused. During my personal encounter with the patient, she was AAOx3 and once again refused any pacing mechanism. She stated that she was 85 and did not want anything else and wanted to go quickly if she was going to die. I reinforced placement of a pacing mechanism several times and she was stead fast with her decision. She had agreed to DNR/DNI with the ED physician.

## 2017-06-25 NOTE — H&P ADULT - NSHPPHYSICALEXAM_GEN_ALL_CORE
PHYSICAL EXAM:      Constitutional: NAD, cachetic frail female   HEENT: PERRLA, EOMI, Normal Hearing, MMM  Back: Normal spine flexure, No CVA tenderness  Respiratory: CTAB  Cardiovascular: S1 and S2, Bradycardic   Gastrointestinal: BS+, soft, NT/ND  Extremities: No peripheral edema  Vascular: 2+ peripheral pulses  Neurological: A/O x 3, no focal deficits  Musculoskeletal: 5/5 strength b/l upper and lower extremities

## 2017-06-26 ENCOUNTER — APPOINTMENT (OUTPATIENT)
Dept: ELECTROPHYSIOLOGY | Facility: CLINIC | Age: 82
End: 2017-06-26

## 2017-06-26 LAB
ANION GAP SERPL CALC-SCNC: 7 MMOL/L — SIGNIFICANT CHANGE UP (ref 5–17)
BUN SERPL-MCNC: 30 MG/DL — HIGH (ref 7–23)
CALCIUM SERPL-MCNC: 8.2 MG/DL — LOW (ref 8.5–10.1)
CHLORIDE SERPL-SCNC: 112 MMOL/L — HIGH (ref 96–108)
CO2 SERPL-SCNC: 23 MMOL/L — SIGNIFICANT CHANGE UP (ref 22–31)
CREAT SERPL-MCNC: 0.97 MG/DL — SIGNIFICANT CHANGE UP (ref 0.5–1.3)
GLUCOSE SERPL-MCNC: 96 MG/DL — SIGNIFICANT CHANGE UP (ref 70–99)
POTASSIUM SERPL-MCNC: 4.1 MMOL/L — SIGNIFICANT CHANGE UP (ref 3.5–5.3)
POTASSIUM SERPL-SCNC: 4.1 MMOL/L — SIGNIFICANT CHANGE UP (ref 3.5–5.3)
SODIUM SERPL-SCNC: 142 MMOL/L — SIGNIFICANT CHANGE UP (ref 135–145)
TROPONIN I SERPL-MCNC: 0.23 NG/ML — HIGH (ref 0.01–0.04)

## 2017-06-26 PROCEDURE — 71010: CPT | Mod: 26

## 2017-06-26 PROCEDURE — 93042 RHYTHM ECG REPORT: CPT

## 2017-06-26 PROCEDURE — 33207 INSERT HEART PM VENTRICULAR: CPT | Mod: 62

## 2017-06-26 PROCEDURE — 93308 TTE F-UP OR LMTD: CPT | Mod: 26

## 2017-06-26 PROCEDURE — 93010 ELECTROCARDIOGRAM REPORT: CPT

## 2017-06-26 PROCEDURE — 99233 SBSQ HOSP IP/OBS HIGH 50: CPT | Mod: 57

## 2017-06-26 RX ORDER — VANCOMYCIN HCL 1 G
1000 VIAL (EA) INTRAVENOUS ONCE
Qty: 0 | Refills: 0 | Status: COMPLETED | OUTPATIENT
Start: 2017-06-26 | End: 2017-06-26

## 2017-06-26 RX ADMIN — Medication 187.5 MILLIGRAM(S): at 19:32

## 2017-06-26 RX ADMIN — SODIUM CHLORIDE 100 MILLILITER(S): 9 INJECTION INTRAMUSCULAR; INTRAVENOUS; SUBCUTANEOUS at 03:57

## 2017-06-26 NOTE — PROGRESS NOTE ADULT - SUBJECTIVE AND OBJECTIVE BOX
syncope.    85F.  lives alone.  preparing dinner and passed out while walking to stove.  in ED found to be in CHB.  given atropine.  takes atenolol.  reversal glucagon gtt + calcium gluconate.  patietnt deferred TVP.    she's highly functioning at home.  lives alone w/ help from the community.  has 2 daughters, one in [facility] and the other lives in [place].    yesterday, unstable bradycardia with a pulse 1600H.  bradycardia 20s + hypotension.  IVF bolus + atropin 0.5mg IV x 2 doses given.  dopamine gtt started for BP support.  transfered to CCU.  TVP placed.  patient remains confused post procedure-unable to obtain consent for PPM.    ROS:  (-) CP.  (-) palpitations.    frail, elderly wf.  no acute distress.  appears comfortable.  neck nml JVP.  lungs CTA.  no w/r/r.  heart regular.  NS1S2.  no m/r/g.  abd soft.  NTND.  no guarding.  no rebound.  ext no pedal edema.  A&Ox3.    MEDICATIONS  (STANDING):  sodium chloride 0.9%. 1000milliLiter(s) IV Continuous <Continuous>  vancomycin  IVPB 1000milliGRAM(s) IV Intermittent once    MEDICATIONS  (PRN):  LORazepam   Injectable 0.5milliGRAM(s) IntraMuscular every 4 hours PRN Agitation    Vital Signs Last 24 Hrs  T(C): 37.1, Max: 37.1 (06-26 @ 06:49)  T(F): 98.7, Max: 98.7 (06-26 @ 06:49)  HR: 76 (25 - 90)  BP: 161/117 (48/22 - 161/117)  BP(mean): 128 (27 - 128)  RR: 24 (12 - 27)  SpO2: 87% (83% - 100%)                        12.6   12.5  )-----------( 145      ( 25 Jun 2017 17:48 )             35.8       06-26    142  |  112<H>  |  30<H>  ----------------------------<  96  4.1   |  23  |  0.97    Ca    8.2<L>      26 Jun 2017 06:34  Mg     2.2     06-25    TPro  6.6  /  Alb  3.6  /  TBili  0.6  /  DBili  x   /  AST  65<H>  /  ALT  55  /  AlkPhos  90  06-24    CARDIAC MARKERS ( 26 Jun 2017 12:39 )  0.232 ng/mL / x     / x     / x     / x      CARDIAC MARKERS ( 25 Jun 2017 13:44 )  x     / x     / 266 U/L / x     / x      CARDIAC MARKERS ( 25 Jun 2017 05:24 )  x     / x     / 174 U/L / x     / x      CARDIAC MARKERS ( 24 Jun 2017 23:32 )  0.016 ng/mL / x     / x     / x     / x      CARDIAC MARKERS ( 24 Jun 2017 20:48 )  <0.015 ng/mL / x     / 207 U/L / x     / x           syncope secondary to CHB.  unstable bradycardia with a pulse.  -transferred to CCU.  -vital sings stabilized post TVP.  -TTE.  -awaiting consent for PPM placement.  -Cardiology + EP consults.    DOROTEO calcification.  -nonemergent chest CT recommended.      T3 vertebral body wedging.  -consider orthopedic spine follow up outpatient.     disposition.  -continue CCU monitoring.

## 2017-06-26 NOTE — PROGRESS NOTE ADULT - SUBJECTIVE AND OBJECTIVE BOX
This is an 85 year old female with a history of a LBBB, hypertension who presents to ED after recent syncopal episode. The patient notes that she was preparing dinner and as she was walking to the stove she passed out. She was able to crawl to the phone and call for help. The patient denied any chest pain. She did not that she had experienced palpitations and an odd sensation in her chest prior to the episode. She notes that her atenolol dose has been decreased over the last few weeks.   In the ED, the patient was noted to be in complete heart block and recieved 0.5mg of atropine. In the ED The patient was advised to proceed with TVP yet she adamantly refused. During my personal encounter with the patient, she was AAOx3 and once again refused any pacing mechanism. She stated that she was 85 and did not want anything else and wanted to go quickly if she was going to die.     She now has been transferred to the telemetry maya and is reconsidering a permanent pacemaker placement.    6/26/17 -  patient confused dependent on temp pacemaker. Will contact healthcare proxy to determine future plans.           Allergies and Intolerances:        Allergies:  	penicillin: Drug, Rash  	timolol ophthalmic: Drug, Rash    Home Medications:   * Patient Currently Takes Medications as of 25-Jun-2017 04:23 documented in Prescription Writer  · 	atenolol 50 mg oral tablet:  orally 2 times a day, Last Dose Taken:    · 	Avapro 75 mg oral tablet: 3 tab(s) orally once a day, Last Dose Taken:      . .    Patient History:   Past Medical History:  HTN (hypertension).    Past Surgical History:  History of appendectomy.    Family History:  No pertinent family history in first degree relatives.    Social History:  Social History (marital status, living situation, occupation, tobacco use, alcohol and drug use, and sexual history): NO tobacco use  No Etoh use	    Tobacco Screening:  · Core Measure Site	No	  · Has the patient used tobacco in the past 30 days?	No	    Risk Assessment:   Present on Admission:  Deep Venous Thrombosis	no	  Pulmonary Embolus	no	  Urinary Catheter	no	  Central Venous Catheter/PICC Line	no	  Surgical Site Incision	no	  Pressure Ulcer(s)	no	    Heart Failure:  Does this patient have a history of or has been diagnosed with heart failure? no.      Physical Exam:  Physical Exam: PHYSICAL EXAM:   Constitutional: NAD, cachetic frail female  HEENT: PERRLA, EOMI, Normal Hearing, MMM Back: Normal spine flexure, No CVA tenderness Respiratory: CTAB Cardiovascular: S1 and S2, Bradycardic  Gastrointestinal: BS+, soft, NT/ND Extremities: No peripheral edema Femoral temp wire.  Vascular: 2+ peripheral pulses Neurological: A/O x 3, no focal deficits Musculoskeletal: 5/5 strength b/l upper and lower extremities	      Labs and Results:    26 Jun 2017 06:34  142    |  112    |  30    ----------------------------<  96    4.1     |  23     |  0.97   Ca    8.2        26 Jun 2017 06:34     CAPILLARY BLOOD GLUCOSE 198 (25 Jun 2017 17:00)  CARDIAC MARKERS ( 26 Jun 2017 12:39 ) 0.232 ng/mL / x     / x     / x     / x

## 2017-06-26 NOTE — CONSULT NOTE ADULT - SUBJECTIVE AND OBJECTIVE BOX
See dictated cardiac consult report (in  Portal)  Syncope and head contusion but no CT head/brain acute pathology.  Has had MR TIFFANIE CURIEL without obstruction.  Originally admitted and refused TVP but then changed her mind with dyspnea and low BP and had TVP by Dr. Camacho.  Needs PPM.  Has had FDAVB and LBBB and recent Holter was ok. Atenolol and Avapro (would not agree to any increase in Avapro dose PTA) and Asx OH.  chronic severe kyphosis    Now says not SOB and No CP  Enz x2 neg  BP now ok and V pacing via temp TVP  Echo pending  Wheezing and WBC up. Had vanco  Also had BB reversal therapy given in ED. (Ca++ and glucagon).      131/107 and HR 72 and v pacing on monitor. R 24  Bilateral scattered wheezes  RRR  2-3/6 systolic murmur  Abd non tender.   Trace edema  Alert. Knows my name immediately. Knows events PTA  Has a 1:1  at bedside. Echo has arrived for  echo test. 12 lead ECG pending.  Has seen Dr. Reynolds (ICU c/s)

## 2017-06-26 NOTE — PROGRESS NOTE ADULT - ASSESSMENT
Pt is a 85y old Female with hx of a LBBB and HTN who presents to ED after a syncopal episode. The patient notes that she was preparing dinner and as she was walking to the stove she passed out. In the ED, the patient was noted to be in complete heart block and received 0.5mg of atropine. She was advised to proceed with TVP yet she adamantly refused. Assessment and Plan:  1) Syncopal episode  -R/T Complete Heart block  -EP eval noted  -Temp pacer placement 6/25  -PPM placement pending  -Cont tele monitoring  2) S/P fall  -R/T heart block  -Spine xRays=neg  -Head CT neg  3) Delirium  -? etilogy  -Recieved Ativan x 1 dose with questionable effect  4) Advance directives  -Pt without capacity today   -DNR/DNI complete  --Will follow

## 2017-06-26 NOTE — CONSULT NOTE ADULT - ASSESSMENT
A: see my dictated report for A/P ( portal)  Severe bradycardia with RBBB escape in setting of FDAVB and LBBB now stabilized with TVP and need PPM now.  WBC and wheezing raises pulmonary issue ? aspitration as she normally does not wheeze.  MR, MOISÉS, LV function and PHTN under eval by echo today.    P: EP to place PPM when appropriate. Rec eval and Tx pulmonary issue (hospitalist and pulmonary Dr. Aguiar if needed)  Will review echo data later and comment further.  H-MD to evaluate pulmonary issues and call pulm c/s as needed.  Continue CCU and monitoring. Rec daily ECG and also another troponin now and in AM. Late troponin increase may occur.  Will follow

## 2017-06-26 NOTE — PROGRESS NOTE ADULT - SUBJECTIVE AND OBJECTIVE BOX
HPI: Pt is a 85y old Female with hx of a LBBB and HTN who presents to ED after a syncopal episode. The patient notes that she was preparing dinner and as she was walking to the stove she passed out. In the ED, the patient was noted to be in complete heart block and received 0.5mg of atropine. She was advised to proceed with TVP yet she adamantly refused. She is currently in the CICU with tele monitoring and we are called for GOC  6/25/17 Seen and examined at bedside in NAD. Denies pain or dyspnea. Considering PPM placement. At the time of my visit she was seen by Dr Camacho and was able to discuss benefit and burden to PPM placement.     6/26/17 Seen and examined at bedside with Constant obs and RN in room. Pt denies pain or dyspnea however agitated and delirious.     PAIN: ( )Yes   ( X)No  Level:  Location:  Intensity:    /10  Quality:  Aggravating Factors:  Alleviating Factors:  Radiation:  Duration/Timing:  Impact on ADLs:    DYSPNEA: ( ) Yes  (X ) No  Level:    PAST MEDICAL & SURGICAL HISTORY:  History of appendectomy        SOCIAL HX:  Lives alone X 15 yrs  2 daughters 1 on LI    Hx opiate tolerance ( )YES  ( X)NO    Baseline ADLs  (Prior to Admission)  ( X) Independent   ( )Dependent    FAMILY HISTORY:  No pertinent family history in first degree relatives      Review of Systems:    Anxiety-  Depression-  Physical Discomfort-denies  Dyspnea-denies  Constipation-denies  Diarrhea-  Nausea-denies  Vomiting-  Anorexia-  Weight Loss-   Cough-  Secretions-  Fatigue-mild  Weakness-  Delirium-mod-severe    All other systems reviewed and negative  Unable to obtain/Limited due to: Delirium      PHYSICAL EXAM:    ICU Vital Signs Last 24 Hrs  T(C): 37.2, Max: 37.2 (06-26 @ 16:14)  T(F): 98.9, Max: 98.9 (06-26 @ 16:14)  HR: 76 (62 - 90)  BP: 161/117 (84/43 - 161/117)  BP(mean): 128 (43 - 128)  ABP: --  ABP(mean): --  RR: 24 (12 - 27)  SpO2: 87% (83% - 100%)      PPSV2:  50 %  FAST:    General: awake,agitated and restless female in bed   Mental Status: Disoriented to time and place  HEENT: Oral mucosa dry  Lungs: Clear to aus Rivas  Cardiac: S1S2+ Bradycardic  GI: Abd soft NT ND + BS  : Voids  Ext:BECKMAN=strength  Neuro: Disorientation      LABS:                                   12.6   12.5  )-----------( 145      ( 25 Jun 2017 17:48 )             35.8   06-26    142  |  112<H>  |  30<H>  ----------------------------<  96  4.1   |  23  |  0.97    Ca    8.2<L>      26 Jun 2017 06:34  Mg     2.2     06-25    TPro  6.6  /  Alb  3.6  /  TBili  0.6  /  DBili  x   /  AST  65<H>  /  ALT  55  /  AlkPhos  90  06-24      Albumin: Albumin, Serum: 3.6 g/dL (06-24 @ 20:48)      Allergies    penicillin (Rash)  timolol ophthalmic (Rash)    Intolerances      MEDICATIONS  (STANDING):  glucagon Infusion 0.05mG/kG/Hr IV Continuous <Continuous>    MEDICATIONS  (PRN):      RADIOLOGY/ADDITIONAL STUDIES:    EXAM:  CHEST SINGLE VIEW FRONTAL                            PROCEDURE DATE:  06/24/2017        INTERPRETATION:  Exam Date: 6/24/2017 11:26 PM    History: Syncope with back pain    Technique: Single frontal portable view of the chest with comparisonto    9/5/2011    Findings:    The heart is normal in size.  The lungs are grossly clear. The apices and   hemidiaphragms are unremarkable. Degenerative changes of the visualized   osseous structures.        Impression:    No acute disease    No significant interval change as compared to  9/5/2011    EXAM:  LUMBAR SPINE                            PROCEDURE DATE:  06/24/2017        INTERPRETATION:  Exam Date: 6/24/2017 11:26 PM  Clinical Information: Fall with back pain  Technique: 3 views of the lumbar spine and 2 views of the thoracic spine     Findings:    Thoracolumbar scoliosis. Diffuse severe degenerative changes. No definite   acute appearing fracture. No definite subluxation.    Impression:    No fracture or subluxation

## 2017-06-27 PROCEDURE — 93010 ELECTROCARDIOGRAM REPORT: CPT

## 2017-06-27 RX ORDER — ATENOLOL 25 MG/1
50 TABLET ORAL DAILY
Qty: 0 | Refills: 0 | Status: DISCONTINUED | OUTPATIENT
Start: 2017-06-27 | End: 2017-06-29

## 2017-06-27 RX ADMIN — ATENOLOL 50 MILLIGRAM(S): 25 TABLET ORAL at 10:41

## 2017-06-27 NOTE — PROGRESS NOTE ADULT - SUBJECTIVE AND OBJECTIVE BOX
HPI:  This is an 85 year old female with a history of a LBBB, hypertension is now S/P Single chamber PPM. Pt with h/o HTN and had rapid response called prior to PPM implant            EKG:  TELE: Ventricular pacing at 70 BPM    MEDICATIONS  (STANDING):  sodium chloride 0.9%. 1000 milliLiter(s) (100 mL/Hr) IV Continuous <Continuous>    MEDICATIONS  (PRN):  LORazepam   Injectable 0.5 milliGRAM(s) IntraMuscular every 4 hours PRN Agitation      Allergies    penicillin (Rash)  timolol ophthalmic (Rash)    Intolerances        Vital Signs Last 24 Hrs  T(C): 36.9 (26 Jun 2017 22:26), Max: 37.2 (26 Jun 2017 16:14)  T(F): 98.5 (26 Jun 2017 22:26), Max: 98.9 (26 Jun 2017 16:14)  HR: 85 (27 Jun 2017 07:00) (60 - 93)  BP: 131/51 (27 Jun 2017 07:00) (84/53 - 168/62)  BP(mean): 68 (27 Jun 2017 07:00) (58 - 128)  RR: 14 (27 Jun 2017 07:00) (14 - 31)  SpO2: 97% (27 Jun 2017 07:00) (90% - 99%)    PHYSICAL EXAMINATION:    GENERAL APPEARANCE:  Pt. is not currently dyspneic, in no distress. Pt. is alert, oriented, and pleasant.  HEENT:  Pupils are normal and react normally. No icterus. Mucous membranes well colored.  NECK:  Supple. No lymphadenopathy. Jugular venous pressure not elevated. Carotids equal.   HEART:   The cardiac impulse has a normal quality. There are no murmurs, rubs or gallops noted  CHEST:  Chest is clear to auscultation. Normal respiratory effort.  ABDOMEN:  Soft and nontender.   EXTREMITIES:  There is no edema.   SKIN:  No rash or significant lesions are noted.    LABS:                        12.6   12.5  )-----------( 145      ( 25 Jun 2017 17:48 )             35.8     06-26    142  |  112<H>  |  30<H>  ----------------------------<  96  4.1   |  23  |  0.97    Ca    8.2<L>      26 Jun 2017 06:34          CARDIAC MARKERS ( 26 Jun 2017 12:39 )  0.232 ng/mL / x     / x     / x     / x      CARDIAC MARKERS ( 25 Jun 2017 13:44 )  x     / x     / 266 U/L / x     / x            RADIOLOGY & ADDITIONAL TESTS:    ASSESSMENT & PLAN: HPI:  This is an 85 year old female with a history of a LBBB, hypertension is now S/P Single chamber PPM. Pt with h/o HTN and had rapid response called prior to PPM implant            EKG: At Fib with Rapid vemt Response at 114 BPM  TELE: Ventricular pacing at 70 BPM    MEDICATIONS  (STANDING):  sodium chloride 0.9%. 1000 milliLiter(s) (100 mL/Hr) IV Continuous <Continuous>    MEDICATIONS  (PRN):  LORazepam   Injectable 0.5 milliGRAM(s) IntraMuscular every 4 hours PRN Agitation      Allergies    penicillin (Rash)  timolol ophthalmic (Rash)    Intolerances        Vital Signs Last 24 Hrs  T(C): 36.9 (26 Jun 2017 22:26), Max: 37.2 (26 Jun 2017 16:14)  T(F): 98.5 (26 Jun 2017 22:26), Max: 98.9 (26 Jun 2017 16:14)  HR: 85 (27 Jun 2017 07:00) (60 - 93)  BP: 131/51 (27 Jun 2017 07:00) (84/53 - 168/62)  BP(mean): 68 (27 Jun 2017 07:00) (58 - 128)  RR: 14 (27 Jun 2017 07:00) (14 - 31)  SpO2: 97% (27 Jun 2017 07:00) (90% - 99%)    PHYSICAL EXAMINATION:    GENERAL APPEARANCE:  Pt. is not currently dyspneic, in no distress. Pt. is responsive but confused.  HEENT:  Pupils are normal and react normally. No icterus. Mucous membranes well colored.  NECK:  Supple. No lymphadenopathy. Jugular venous pressure not elevated. Carotids equal.   HEART:   The cardiac impulse has a normal quality. There are no murmurs, rubs or gallops noted  CHEST:  Chest is clear to auscultation. Normal respiratory effort. Steri strips in place no hematoma noted  ABDOMEN:  Soft and nontender.   EXTREMITIES:  There is no edema.   SKIN:  No rash or significant lesions are noted.    LABS:                        12.6   12.5  )-----------( 145      ( 25 Jun 2017 17:48 )             35.8     06-26    142  |  112<H>  |  30<H>  ----------------------------<  96  4.1   |  23  |  0.97    Ca    8.2<L>      26 Jun 2017 06:34          CARDIAC MARKERS ( 26 Jun 2017 12:39 )  0.232 ng/mL / x     / x     / x     / x      CARDIAC MARKERS ( 25 Jun 2017 13:44 )  x     / x     / 266 U/L / x     / x            RADIOLOGY & ADDITIONAL TESTS:    No obvious pneumo HPI:  This is an 85 year old female with a history of a LBBB, hypertension is now S/P Single chamber PPM. Pt with h/o HTN and had rapid response called prior to PPM implant            EKG: At Fib with Rapid vent Response at 114 BPM  TELE: Ventricular pacing at 70 BPM    MEDICATIONS  (STANDING):  sodium chloride 0.9%. 1000 milliLiter(s) (100 mL/Hr) IV Continuous <Continuous>    MEDICATIONS  (PRN):  LORazepam   Injectable 0.5 milliGRAM(s) IntraMuscular every 4 hours PRN Agitation      Allergies    penicillin (Rash)  timolol ophthalmic (Rash)    Intolerances        Vital Signs Last 24 Hrs  T(C): 36.9 (26 Jun 2017 22:26), Max: 37.2 (26 Jun 2017 16:14)  T(F): 98.5 (26 Jun 2017 22:26), Max: 98.9 (26 Jun 2017 16:14)  HR: 85 (27 Jun 2017 07:00) (60 - 93)  BP: 131/51 (27 Jun 2017 07:00) (84/53 - 168/62)  BP(mean): 68 (27 Jun 2017 07:00) (58 - 128)  RR: 14 (27 Jun 2017 07:00) (14 - 31)  SpO2: 97% (27 Jun 2017 07:00) (90% - 99%)    PHYSICAL EXAMINATION:    GENERAL APPEARANCE:  Pt. is not currently dyspneic, in no distress. Pt. is responsive but confused.  HEENT:  Pupils are normal and react normally. No icterus. Mucous membranes well colored.  NECK:  Supple. No lymphadenopathy. Jugular venous pressure not elevated. Carotids equal.   HEART:   The cardiac impulse has a normal quality. There are no murmurs, rubs or gallops noted  CHEST:  Chest is clear to auscultation. Normal respiratory effort. Steri strips in place no hematoma noted  ABDOMEN:  Soft and nontender.   EXTREMITIES:  There is no edema.   SKIN:  No rash or significant lesions are noted.    LABS:                        12.6   12.5  )-----------( 145      ( 25 Jun 2017 17:48 )             35.8     06-26    142  |  112<H>  |  30<H>  ----------------------------<  96  4.1   |  23  |  0.97    Ca    8.2<L>      26 Jun 2017 06:34          CARDIAC MARKERS ( 26 Jun 2017 12:39 )  0.232 ng/mL / x     / x     / x     / x      CARDIAC MARKERS ( 25 Jun 2017 13:44 )  x     / x     / 266 U/L / x     / x            RADIOLOGY & ADDITIONAL TESTS:    No obvious pneumo

## 2017-06-27 NOTE — PROGRESS NOTE ADULT - SUBJECTIVE AND OBJECTIVE BOX
syncope.    85F.  lives alone.  preparing dinner and passed out while walking to stove.  in ED found to be in CHB.  given atropine.  takes atenolol.  reversal glucagon gtt + calcium gluconate.  patietnt deferred TVP.    she's highly functioning at home.  lives alone w/ help from the community.  has 2 daughters, one in [facility] and the other lives in [place].    OOB in chair.  offers no complaints.    ROS:  (-) CP.  (-) palpitations.    frail, elderly wf.  no acute distress.  appears comfortable.  neck nml JVP.  lungs CTA.  no w/r/r.  heart regular.  NS1S2.  no m/r/g.  abd soft.  NTND.  no guarding.  no rebound.  ext no pedal edema.  A&Ox3.    MEDICATIONS  (STANDING):  sodium chloride 0.9%. 1000 milliLiter(s) (100 mL/Hr) IV Continuous <Continuous>  ATENolol  Tablet 50 milliGRAM(s) Oral daily    MEDICATIONS  (PRN):  LORazepam   Injectable 0.5 milliGRAM(s) IntraMuscular every 4 hours PRN Agitation    Vital Signs Last 24 Hrs  T(C): 36.9 (27 Jun 2017 08:28), Max: 37.2 (26 Jun 2017 16:14)  T(F): 98.5 (27 Jun 2017 08:28), Max: 98.9 (26 Jun 2017 16:14)  HR: 91 (27 Jun 2017 13:00) (60 - 110)  BP: 147/74 (27 Jun 2017 13:00) (101/79 - 168/62)  BP(mean): 85 (27 Jun 2017 13:00) (68 - 112)  RR: 33 (27 Jun 2017 13:00) (14 - 34)  SpO2: 96% (27 Jun 2017 10:00) (90% - 99%)                        12.6   12.5  )-----------( 145      ( 25 Jun 2017 17:48 )             35.8     06-26    142  |  112<H>  |  30<H>  ----------------------------<  96  4.1   |  23  |  0.97    Ca    8.2<L>      26 Jun 2017 06:34      CARDIAC MARKERS ( 26 Jun 2017 12:39 )  0.232 ng/mL / x     / x     / x     / x        syncope secondary to CHB.  unstable bradycardia with a pulse.  -transferred to CCU and PPM placed.  -TTE.  -HR .  BP improved.  may resume atenolol 50mg po qd.  -Cardiology + EP input appreciated..    DOROTEO calcification.  -nonemergent chest CT recommended.      T3 vertebral body wedging.  -consider orthopedic spine follow up outpatient.     disposition.  -transfer to general medical [facility].  -mobilize/PT.  -d/c planning.

## 2017-06-27 NOTE — PROGRESS NOTE ADULT - ASSESSMENT
85y old Female with hx of a LBBB and HTN admitted on 6/24 after a syncopal episode. Found to have complete heart block. Initially refused PPM, but reconsidered, now s/p PPM. Hospital course c/b delirium, resolving. Palliative care consulted to assist with establishing GOC.    1) Syncopal episode  -R/T Complete Heart block  -s/p PPM 6/26  - cardiology note appreciated, going to med floor  - PHTN w/ bl pleural effusions, but heart function seems to be intact as per cardio notes  - no further interventions recommended in accordance w/ pt's stated wishes    2) S/P fall  -R/T heart block  -Spine xRays=neg  -Head CT neg    3) Delirium  -? etilogy  - Received Ativan x 1 dose with questionable effect  - clearing up, oriented today with only mild evidence of some residual paranoid delusions from evening    4) Advance directives  -Pt with clearing sensorium  -DNR/DNI complete, which family in agreement with  - recommended home palliative care services upon dc, pt and family agree with this. Also advised PT consult to assess safety of home plan as well as informing family that if needed SW can help them arrange some private aide support. Agree with Dr. Leggett, that abiding by pt's wishes for no further aggressive intervention makes most sense. Pt is not hospice candidate currently, but going home with home pall services will serve as bridge to this should any chronic progressive disease arise (i.e. if pHTN worsens or continues to result in sx-producing pleural effusions/reliance on O2) that would justify this level of care/support.    Thank you for including us in Mrs. Gupta's care. Will continue to follow with you.    Aaron Rivera MD  Palliative Care Attending

## 2017-06-27 NOTE — PROGRESS NOTE ADULT - ASSESSMENT
ASSESSMENT & PLAN:  Pt is s/p PPM single chamber and is stable from EP point of view ASSESSMENT & PLAN:  Pt is s/p PPM single chamber and is stable from EP point of view  Pt has episode of At. Fib but is a fall response

## 2017-06-27 NOTE — PROGRESS NOTE ADULT - SUBJECTIVE AND OBJECTIVE BOX
HPI: Pt seen and examined in follow up for GOC and sx, sister/HCP1 Kylie and her  at bedside. Pt got PPM yesterday, tolerated well. Still on 1:1 improved mentation, able to fully orient to person, place, time and why she is here and awareness of having procedure. However, still with mild signs of delirium with paranoid delusions, noting "night-vaders" came to get her in the am, hearing chain saws and that the tv was a means of them watching her. Able to explain that this was likely just phlebotomy coming to draw blood and that there tend to be many sounds in the ICU, which she was able to understand and accept.       PAIN: mild   Location- left side, attributed to fall  Intensity- mild  Quality- ache  Aggravating Factors- only when moved  Alleviating Factors- rest, not interested in meds for this, knows it is from fall and says it will subside on it's own  Timing- incidental    DYSPNEA: denies      ROS:    Anxiety- denies  Constipation- denies  Diarrhea- denies  Nausea- denies  Vomiting- denies  Anorexia- denies  Cough- denies  Secretions- denies  Fatigue- mild  Weakness- mild, eager to get up and be mobile with PT    All other systems reviewed and negative      PHYSICAL EXAM:    Vital Signs Last 24 Hrs  T(C): 36.9 (27 Jun 2017 08:28), Max: 37.2 (26 Jun 2017 16:14)  T(F): 98.5 (27 Jun 2017 08:28), Max: 98.9 (26 Jun 2017 16:14)  HR: 94 (27 Jun 2017 08:28) (60 - 94)  BP: 131/51 (27 Jun 2017 07:00) (101/79 - 168/62)  BP(mean): 68 (27 Jun 2017 07:00) (68 - 112)  RR: 23 (27 Jun 2017 08:28) (14 - 31)  SpO2: 99% (27 Jun 2017 08:28) (90% - 99%)  Daily     Daily     General: Elderly female sitting up in chair, 1:1 at bedside, pleasant, friendly, NAD  HEENT: mmm, perrl, eomi  Lungs: clear  Cardiac: +s1 s2, rrr, PPM site clean  GI: soft nt nd +bs  : voiding  Ext: left arm in sling, no edema  Neuro: no focal deficits      LABS:                        12.6   12.5  )-----------( 145      ( 25 Jun 2017 17:48 )             35.8     06-26    142  |  112<H>  |  30<H>  ----------------------------<  96  4.1   |  23  |  0.97    Ca    8.2<L>      26 Jun 2017 06:34        Albumin: Albumin, Serum: 3.6 g/dL (06-24 @ 20:48)      Allergies    penicillin (Rash)  timolol ophthalmic (Rash)    Intolerances      MEDICATIONS  (STANDING):  sodium chloride 0.9%. 1000 milliLiter(s) (100 mL/Hr) IV Continuous <Continuous>  ATENolol  Tablet 50 milliGRAM(s) Oral daily    MEDICATIONS  (PRN):  LORazepam   Injectable 0.5 milliGRAM(s) IntraMuscular every 4 hours PRN Agitation      RADIOLOGY:    EXAM:  CHEST SINGLE VIEW FRONTAL                        PROCEDURE DATE:  06/26/2017        IMPRESSION:     New small bilateral pleural effusions and perihilar opacities, probably   pulmonary edema.      DELILAH SHER   This document has been electronically signed. Jun 27 2017 10:44AM

## 2017-06-27 NOTE — PROGRESS NOTE ADULT - SUBJECTIVE AND OBJECTIVE BOX
S: Patient less SOB and no CP. Has been reportedly confised and still has 1:1 at bedside. Knows me and says she does not want any more medical care and she says in CCU too many people coming in and out of her room and she does not know them. She is calm after my reassurance. CCU RN confirms patient is DNR/DNI again and cardio NP says health care proxy (sister) also agrees with this.  Enzyme abnormality is mild and likely demand ischemia. Had PPM yesterday by Dr. Leigh with family agreement even with DNR/DNI.  I rev echo: Bilateral pleural effusions. LV function normal with EF 62% by planimetry and 60-65% by estimate. Slight basal septal MOISÉS as before. Severe PHTN and 1+ MR and TR.  Had PAF and LBBB alt V-pacing at 60 BPM. BP ok.)              Vital Signs Last 24 Hrs  T(C): 36.9 (06-27-17 @ 08:28), Max: 37.2 (06-26-17 @ 16:14)  T(F): 98.5 (06-27-17 @ 08:28), Max: 98.9 (06-26-17 @ 16:14)  HR: 94 (06-27-17 @ 08:28) (60 - 94)  BP: 131/51 (06-27-17 @ 07:00) (101/79 - 168/62)  BP(mean): 68 (06-27-17 @ 07:00) (68 - 128)  RR: 23 (06-27-17 @ 08:28) (14 - 31)  SpO2: 99% (06-27-17 @ 08:28) (90% - 99%)    PHYSICAL EXAM:      Constitutional: thin, supine, NAD    Neck:  No JVP vis    Respiratory: Lungs with few basal anterlateral rales while supine and otherwise clear ant and no wheezing.    Cardiovascular: RRR now 2-3/6 systolic murmur    Gastrointestinal: Abd non tender    Extremities: No edema    Vascular: No change    Neurological: Alert.    Skin: No gen rash      Musculoskeletal: No calf tend    Psychiatric: Calm now after my reassurance.    ECG: AF LBBB  Yesterday V pacing.    Troponin 0.232

## 2017-06-27 NOTE — PROGRESS NOTE ADULT - ASSESSMENT
A: MR is mild and LA not really enlarged. LV function is normal and MOISÉS is non obstructive. Has severe PHTN and bilateral pleural effusions and also a DOROTEO abnormality. Pleural effusions may relate to PHTN although TR is mild at 1+. Other causes can be possible given the lung lesion which is pleural based. (Malignancy might also be a possibility).  She does not want anything further done. the PPM gives her at least a chance at living although co-morbidities may impact on this likelihood.  Enzymes related to demand ischemia. (Has Hx anomalous Cx CAD)  BP is ok. PAF but at this time it seems risk > benefit of starting any Coumadin. She might bleed and also is a falling risk.  Since she is a DNR/DNI perhaps best she go to medical floor for comfort care. She is upset by the CCU routine of intensive FU and may be more comfortable out of CCU environment. Since no resuscitation permitted by the status, continued monitoring will not change the outcome and she has a functioning PPM.    P: Discussed with patient and with CCU RN, cardio NP and CCU charge nurse.  Hospitalist can transfer her to medical floor. I agree with comfort care. Would thus not advise any procedures related to pleural effusions. Notwithstanding the need for the PPM already done the less we do to her now the better in my opinion.

## 2017-06-28 RX ORDER — ACETAMINOPHEN 500 MG
650 TABLET ORAL EVERY 6 HOURS
Qty: 0 | Refills: 0 | Status: DISCONTINUED | OUTPATIENT
Start: 2017-06-28 | End: 2017-06-29

## 2017-06-28 RX ADMIN — ATENOLOL 50 MILLIGRAM(S): 25 TABLET ORAL at 06:10

## 2017-06-28 NOTE — PROGRESS NOTE ADULT - ASSESSMENT
A: MR was mild. Has severe PHTN and pleural effusions. Clinically her breathing is now much better and she is comfortable. Had demand ischemia and no angina. DNR/DNI reported. BP is ok not high or los. New AF and old LBBB. Has functioning PPM and ECG 6/26/17 showed v-pacing rhythm. HR control via atenolol now. Falling risk precludes anticoagulation despite stroke risk with AF. LV function on echo was good.    P: Suspect she will go to rehab and if back to her usual state of health maybe then home again. She knows to make FU with me and PCP once home unless in facility permanently. Atenolol to continue as tolerated. Management was discussed (including status, improvement, possible disposition) with patient. I discussed status yestgerday with PCP. Await disposition to be determined by hospitalist. Once home discussed activity and symptoms to merit Er or medical attention. I will not follow now. Please recall if needed. Dr. Leigh to arrange PPM monitoring via EP office before she leaves.

## 2017-06-28 NOTE — PROGRESS NOTE ADULT - SUBJECTIVE AND OBJECTIVE BOX
S: Much more comfortable. Resp now 14 and lying at about 20-30 degrees on her back. No chest pain. Fears falling once home. ECG yesterday AF and LBBB. Back on atenolol for VR control. Risk > benefit of VKA due to risk of falling again. PPM will prevent pierre but other issues relate to fall risk. No new labs.               Vital Signs Last 24 Hrs  T(C): 36.4 (06-28-17 @ 05:17), Max: 36.7 (06-27-17 @ 15:11)  T(F): 97.5 (06-28-17 @ 05:17), Max: 98 (06-27-17 @ 15:11)  HR: 81 (06-28-17 @ 05:17) (81 - 110)  BP: 125/59 (06-28-17 @ 05:17) (121/102 - 147/74)  BP(mean): 85 (06-27-17 @ 13:00) (79 - 107)  RR: 20 (06-28-17 @ 05:17) (20 - 34)  SpO2: 96% (06-28-17 @ 05:17) (94% - 99%)    PHYSICAL EXAM:      Constitutional: thin, NAD    Neck: No vis JVP    Respiratory:Left lung clear ant and right has scattered rales.    Cardiovascular: Irreg c/w AF    Gastrointestinal: No abd tend.    Extremities: No calf tend    Vascular: No vasc insufficiency    Neurological: Alert. Knows me by name immediately. Asks me to send her regards to my office staff.    Skin: No gen rash    Musculoskeletal: No calf tend    Psychiatric: calm and normal affect.

## 2017-06-28 NOTE — PHYSICAL THERAPY INITIAL EVALUATION ADULT - CRITERIA FOR SKILLED THERAPEUTIC INTERVENTIONS
anticipated equipment needs at discharge/therapy frequency/anticipated discharge recommendation/predicted duration of therapy intervention/functional limitations in following categories/risk reduction/prevention/rehab potential/impairments found

## 2017-06-28 NOTE — PHYSICAL THERAPY INITIAL EVALUATION ADULT - PERTINENT HX OF CURRENT PROBLEM, REHAB EVAL
preparing dinner and passed out while walking to stove.  in ED found to be in CHB. transferred to CCU and PPM placed. trop .232, cardiol note this date indicates demand ischemia and no angina

## 2017-06-28 NOTE — PROGRESS NOTE ADULT - SUBJECTIVE AND OBJECTIVE BOX
HPI: Pt seen and examined this am in follow up for sx and GOC. Pt feeling well today, dyspnea, eating well and slept well with no further delusions. Noting working with PT today, with gait issues and weakness. Also endorsing pain on side with movement, again attributed to fall, but open to trying Tylenol today.       ROS:    All other systems reviewed and negative      PHYSICAL EXAM:    Vital Signs Last 24 Hrs  T(C): 36.6 (28 Jun 2017 12:00), Max: 36.7 (27 Jun 2017 15:11)  T(F): 97.8 (28 Jun 2017 12:00), Max: 98 (27 Jun 2017 15:11)  HR: 85 (28 Jun 2017 12:00) (81 - 95)  BP: 115/44 (28 Jun 2017 12:00) (115/44 - 147/74)  BP(mean): 85 (27 Jun 2017 13:00) (85 - 85)  RR: 18 (28 Jun 2017 12:00) (18 - 33)  SpO2: 96% (28 Jun 2017 12:00) (94% - 96%)  Daily     Daily     General: Elderly female sitting up in chair, , pleasant, friendly, NAD  HEENT: mmm, perrl, eomi  Lungs: clear  Cardiac: +s1 s2, rrr, PPM site clean  GI: soft nt nd +bs  : voiding  Ext: left arm in sling, no edema  Neuro: no focal deficits        LABS:        Albumin: Albumin, Serum: 3.6 g/dL (06-24 @ 20:48)      Allergies    penicillin (Rash)  timolol ophthalmic (Rash)    Intolerances      MEDICATIONS  (STANDING):  ATENolol  Tablet 50 milliGRAM(s) Oral daily    MEDICATIONS  (PRN):  LORazepam   Injectable 0.5 milliGRAM(s) IntraMuscular every 4 hours PRN Agitation      RADIOLOGY:

## 2017-06-28 NOTE — PHYSICAL THERAPY INITIAL EVALUATION ADULT - ACTIVE RANGE OF MOTION EXAMINATION, REHAB EVAL
Right UE Active ROM was WFL (within functional limits)/bilateral  lower extremity Active ROM was WFL (within functional limits)/deficits as listed below/L shld NT, elbow WFL, B DF ~0

## 2017-06-28 NOTE — PHYSICAL THERAPY INITIAL EVALUATION ADULT - ADDITIONAL COMMENTS
age indet. mild ant. T3 vertebral body wedging noted on CT-MD rec. outpt ortho spine consult, ok to mobilize w/ PT age indet. mild ant. T3 vertebral body wedging noted on CT-MD rec. outpt ortho spine consult, ok to mobilize w/ PT    pt reports lives in 2 level house, stays ground floor, ~2 JALYN w/ rail, indep amb w/ SAC outside, w/ RW inside.

## 2017-06-28 NOTE — PROGRESS NOTE ADULT - SUBJECTIVE AND OBJECTIVE BOX
syncope.    85F.  lives alone.  preparing dinner and passed out while walking to stove.  in ED found to be in CHB.  given atropine.  takes atenolol.  reversal glucagon gtt + calcium gluconate.  patietnt deferred TVP.    she's highly functioning at home.  lives alone w/ help from the community.  has 2 daughters, one in [facility] and the other lives in [place].    comfortable.  no new complaints.    ROS:  (-) CP.  (-) palpitations.    frail, elderly wf.  no acute distress.  appears comfortable.  neck nml JVP.  lungs CTA.  no w/r/r.  heart regular.  NS1S2.  no m/r/g.  abd soft.  NTND.  no guarding.  no rebound.  ext no pedal edema.  A&Ox3.      MEDICATIONS  (STANDING):  ATENolol  Tablet 50 milliGRAM(s) Oral daily    MEDICATIONS  (PRN):  LORazepam   Injectable 0.5 milliGRAM(s) IntraMuscular every 4 hours PRN Agitation  acetaminophen  Suppository. 650 milliGRAM(s) Rectal every 6 hours PRN Mild Pain (1 - 3)    Vital Signs Last 24 Hrs  T(C): 37.3 (28 Jun 2017 16:58), Max: 37.3 (28 Jun 2017 16:58)  T(F): 99.1 (28 Jun 2017 16:58), Max: 99.1 (28 Jun 2017 16:58)  HR: 90 (28 Jun 2017 16:58) (81 - 90)  BP: 154/76 (28 Jun 2017 16:58) (115/44 - 154/76)  BP(mean): --  RR: 18 (28 Jun 2017 16:58) (18 - 20)  SpO2: 96% (28 Jun 2017 12:00) (96% - 96%)    syncope secondary to CHB.  unstable bradycardia with a pulse.  -transferred to CCU and PPM placed.  -TTE.  -HR .  BP improved.  may resume atenolol 50mg po qd.  -Cardiology + EP input appreciated..    DOROTEO calcification.  -nonemergent chest CT recommended.      T3 vertebral body wedging.  -consider orthopedic spine follow up outpatient.     disposition.  -[facility].  -mobilize/PT.  -d/c planning to [facility].

## 2017-06-28 NOTE — PROGRESS NOTE ADULT - ASSESSMENT
85y old Female with hx of a LBBB and HTN admitted on 6/24 after a syncopal episode. Found to have complete heart block. Initially refused PPM, but reconsidered, now s/p PPM. Hospital course c/b delirium, resolving. Palliative care consulted to assist with establishing GOC.    1) Syncopal episode  -R/T Complete Heart block  -s/p PPM 6/26  - cardiology note appreciated, going to med floor  - PHTN w/ bl pleural effusions, but heart function seems to be intact as per cardio notes  - no further interventions recommended in accordance w/ pt's stated wishes    2) S/P fall  -R/T heart block  -Spine xRays=neg  -Head CT neg    3) Delirium  -? etilogy  - Received Ativan x 1 dose with questionable effect  - back to baseline today with no residual paranoid delusions from evening    4) Advance directives  -Pt with clearing sensorium  -DNR/DNI complete, which family in agreement with  - recommended home palliative care services upon dc, pt and family agree with this. Also advised PT consult to assess safety of home plan vs. [facility]. Agree with Dr. Leggett, that abiding by pt's wishes for no further aggressive intervention makes most sense. Pt is not hospice candidate currently, but going home with home pall services will serve as bridge to this should any chronic progressive disease arise (i.e. if pHTN worsens or continues to result in sx-producing pleural effusions/reliance on O2) that would justify this level of care/support.    Thank you for including us in Mrs. Gupta's care. Will continue to follow with you.    Aaron Rivera MD  Palliative Care Attending

## 2017-06-28 NOTE — PHYSICAL THERAPY INITIAL EVALUATION ADULT - GENERAL OBSERVATIONS, REHAB EVAL
pt rec'd supine in bed on 2SW. O2 in place. no c/o at rest, c/o Lpost-lat rib area pain w/ mobility (CXR (-) rib fx)

## 2017-06-29 VITALS
OXYGEN SATURATION: 95 % | TEMPERATURE: 98 F | DIASTOLIC BLOOD PRESSURE: 73 MMHG | SYSTOLIC BLOOD PRESSURE: 153 MMHG | HEART RATE: 83 BPM

## 2017-06-29 RX ORDER — ACETAMINOPHEN 500 MG
1 TABLET ORAL
Qty: 0 | Refills: 0 | DISCHARGE
Start: 2017-06-29

## 2017-06-29 RX ORDER — ATENOLOL 25 MG/1
0 TABLET ORAL
Qty: 0 | Refills: 0 | COMMUNITY

## 2017-06-29 RX ORDER — IRBESARTAN 75 MG/1
3 TABLET ORAL
Qty: 0 | Refills: 0 | COMMUNITY

## 2017-06-29 RX ORDER — ATENOLOL 25 MG/1
1 TABLET ORAL
Qty: 0 | Refills: 0 | DISCHARGE
Start: 2017-06-29

## 2017-06-29 RX ADMIN — ATENOLOL 50 MILLIGRAM(S): 25 TABLET ORAL at 06:02

## 2017-06-29 RX ADMIN — Medication 650 MILLIGRAM(S): at 02:35

## 2017-06-29 NOTE — DISCHARGE NOTE ADULT - PATIENT PORTAL LINK FT
“You can access the Follow[employer] Patient Portal, offered by [facility], by registering with the following website: [email or web address]”

## 2017-06-29 NOTE — PROGRESS NOTE ADULT - ASSESSMENT
85y old Female with hx of a LBBB and HTN admitted on 6/24 after a syncopal episode. Found to have complete heart block. Initially refused PPM, but reconsidered, now s/p PPM. Hospital course c/b delirium, resolving. Palliative care consulted to assist with establishing GOC.    1) Syncopal episode  -R/T Complete Heart block  -s/p PPM 6/26  - cardiology note appreciated, going to med floor  - PHTN w/ bl pleural effusions, but heart function seems to be intact as per cardio notes  - no further interventions recommended in accordance w/ pt's stated wishes    2) S/P fall  -R/T heart block  -Spine xRays=neg  -Head CT neg    3) Delirium  -? etilogy  - Received Ativan x 1 dose with questionable effect  - back to baseline today with no residual paranoid delusions from evening    4) Advance directives  -Pt with clearing sensorium  -DNR/DNI complete, which family in agreement with  - recommended home palliative care services upon dc, pt and family agree with this. Plan is for [facility], pending acceptance. MOLST filled out: DNR, comfort only, DNI, do not send, no feeding tube, no IVF, no antibiotics, no dialysis, no transfusions, no ICU, no pressors.    Thank you for including us in Mrs. Gupta's care. Will continue to follow with you.    Aaron Rivera MD  Palliative Care Attending 85y old Female with hx of a LBBB and HTN admitted on 6/24 after a syncopal episode. Found to have complete heart block. Initially refused PPM, but reconsidered, now s/p PPM. Hospital course c/b delirium, resolving. Palliative care consulted to assist with establishing GOC.    1) Syncopal episode  -R/T Complete Heart block  -s/p PPM 6/26  - cardiology note appreciated, going to med floor  - PHTN w/ bl pleural effusions, but heart function seems to be intact as per cardio notes  - no further interventions recommended in accordance w/ pt's stated wishes    2) S/P fall  -R/T heart block  -Spine xRays=neg  -Head CT neg    3) Delirium  -? etilogy  - Received Ativan x 1 dose with questionable effect  - back to baseline today with no residual paranoid delusions from evening    4) Advance directives  -Pt with clearing sensorium  -DNR/DNI complete, which family in agreement with  - recommended home palliative care services upon dc, pt and family agree with this. Plan is for [facility], pending acceptance. MOLST filled out: DNR, comfort only, DNI, do not send, no feeding tube, no IVF, no antibiotics, no dialysis, no transfusions, no ICU, no pressors. *Spent 32 minutes discussing GOC with family including Advance care planning, explanation and discussion of advance directives, reviewed MOLST and finalized this form.     Thank you for including us in Mrs. Gupta's care. Will continue to follow with you.    Aaron Rivera MD  Palliative Care Attending

## 2017-06-29 NOTE — DISCHARGE NOTE ADULT - CARE PLAN
Principal Discharge DX:	Syncope  Goal:	see below.  Instructions for follow-up, activity and diet:	continue atenolol.  follow up with EP-Dr. Leigh.

## 2017-06-29 NOTE — DISCHARGE NOTE ADULT - HOSPITAL COURSE
syncope.    85F.  lives alone.  preparing dinner and passed out while walking to stove.  in ED found to be in CHB.  given atropine.  takes atenolol.  reversal glucagon gtt + calcium gluconate.  patietnt deferred TVP.    she's highly functioning at home.  lives alone w/ help from the community.  has 2 daughters, one in [facility] and the other lives in [place].    comfortable.  no new complaints.    ROS:  (-) CP.  (-) palpitations.    frail, elderly wf.  no acute distress.  appears comfortable.  neck nml JVP.  lungs CTA.  no w/r/r.  heart regular.  NS1S2.  no m/r/g.  abd soft.  NTND.  no guarding.  no rebound.  ext no pedal edema.  A&Ox3.    MEDICATIONS  (STANDING):  ATENolol  Tablet 50 milliGRAM(s) Oral daily    MEDICATIONS  (PRN):  LORazepam   Injectable 0.5 milliGRAM(s) IntraMuscular every 4 hours PRN Agitation  acetaminophen  Suppository. 650 milliGRAM(s) Rectal every 6 hours PRN Mild Pain (1 - 3)    Vital Signs Last 24 Hrs  T(C): 36.7 (29 Jun 2017 11:09), Max: 37.3 (28 Jun 2017 16:58)  T(F): 98.1 (29 Jun 2017 11:09), Max: 99.1 (28 Jun 2017 16:58)  HR: 90 (29 Jun 2017 11:09) (77 - 90)  BP: 130/69 (29 Jun 2017 11:09) (115/44 - 154/76)  BP(mean): --  RR: 18 (29 Jun 2017 11:09) (16 - 18)  SpO2: 94% (29 Jun 2017 11:09) (93% - 96%)    syncope secondary to CHB.  unstable bradycardia with a pulse.  -PPM placed.  -HR 77-90.  BP improved.  resumed atenolol 50mg po qd.  -Cardiology + EP input appreciated.    DOROTEO calcification.  -nonemergent chest CT recommended at the discretion of PMD.     T3 vertebral body wedging.  -consider orthopedic spine follow at the discretion of PMD.    disposition.  -d/c to [facility].  -d/w PMD Dr. Sean Infante.  -d/c > 35 minutes.

## 2017-06-29 NOTE — DIETITIAN INITIAL EVALUATION ADULT. - OTHER INFO
Pt seen for length of stay. Pt reports good intake and is enjoying food. Pt eats well at home. Pt reports she has been losing wt over the past years, but current wt has been stable. Pt drinks boost at home, but did not want any in hospital.

## 2017-06-29 NOTE — DISCHARGE NOTE ADULT - CARE PROVIDER_API CALL
Minh Leigh), Cardiac Electrophysiology; Cardiovascular Disease  [address] [place] [address]  Phone: [phone]  Fax: [phone]    Sean Infante), [place] [address]  Phone: [phone]  Fax: [phone]

## 2017-06-29 NOTE — CHART NOTE - NSCHARTNOTEFT_GEN_A_CORE
[facility]  Electrophysiology Lab    Temporary Pacemaker Placement    Patient Information    Patient Name		Padmini Gupta  Procedure Date		2017  MR Number   	              [id number]  			[id number]  Age			85yrs  Gender			Male  Electrophysiologist	Jose Camacho MD    Patient History  This patient presented  with complete heart block and was initially unwilling to proceed w/ invasive therapy but has since changed her mind when she began to develop recurrent symptoms.  Indication:	Complete AV block (I44.2)    Procedure    R2 Pads were placed AP.   Vancomycin 500mg IV was ordered.   At this point the right groin was prepped and draped w/ chlorhexidine solution and the region was draped using sterile technique.  A 1% Lidocaine solutions was used to anesthetize this region and a guide wire was brought into the right femoral vein via seldinger technique.  A 6 fr sheath was placed and sewn into position.  A  5fr pacing (bipolar) lead was advanced up and placed in the RV apex using fluoro guidance.  Adequate pacing capture was confirmed and the wire was secured w/ Tagaderm to the site as it came out from the sheath.  The sheath was connected to a temporary pacing box and was set to 80bpm and 5mA (Pacing threshold was less than 1mA).         Complications:  None    Summary:  Successful temporary pacing wire placement.    Recommendations:	  	Eventual Permanent Pacemaker Implant, possibly tomorrow.             Jose Camacho MD, [facility], [facility]   ABI Certification in Cardiac EP / Cardiovascular Disease & Internal Medicine  [facility]
Team met with pt. and her daughter Jodee at bedside to review and complete MOLST. Plan is for pt. to go to [facility] upon d/c.    MOLST was reviewed and completed. MOLST states DNR/DNI, Comfort measures, do not send to hospital, No feeding tube, No IV fluids, No Antibiotics, No Dialysis, No pressors, No ICU, No transfusions, Just comfort.      Pt. awaiting acceptance from [facility] facility. Emotional support provided.
The patient developed symptoms of dyspnea and was noted to be in distress w/ hypotension.  Dr. Tom D'amico was at bedside and noted that the patient now wanted to be resuscitated and did not want DNR with full measures requested to be taken.    Upon noting this we have activated the cath lab crew and are taking her to the Cath lab for emergent transvenous temporary pacemaker.

## 2017-06-29 NOTE — PROGRESS NOTE ADULT - SUBJECTIVE AND OBJECTIVE BOX
HPI: Pt seen and examined in follow up for sx and GOC. Pt feeling well, much improved from admission. Denies pain or dyspnea.     Pt understands plan for ESTRELLA, noting she hopes to try and get stronger to get back to being as independent as possible. She is very clear however, that she understands the limitations of her body and her age and has every intent on focusing on her comfort. In accordance with this sentiment and in preparation for ESTRELLA discharge we completed a MOLST form together. Pt's daughter Jodee later joined and we reviewed these decisions with her as well and she promised to honor them. MOLST decisions are as follows: DNR, comfort measures only, DNI, do not send to the hospital (says she would want hospice referral immediately if ever sent back here), no feeding tube, no IVF (daughter was perplexed by this choice, but pt knows she can always change her mind, for now clear that if her time is coming from dehydration or low BP she wants nothing artificially maintaining her), no antibiotics, no dialysis, no transfusions, no ICU, and no pressors. Family made aware that SW sending out updated REGINO and awaiting acceptance from facilities.      ROS:  All other systems reviewed and negative      PHYSICAL EXAM:    Vital Signs Last 24 Hrs  T(C): 36.7 (2017 11:09), Max: 37.3 (2017 16:58)  T(F): 98.1 (2017 11:09), Max: 99.1 (2017 16:58)  HR: 90 (2017 11:09) (77 - 90)  BP: 130/69 (2017 11:09) (130/69 - 154/76)  BP(mean): --  RR: 18 (2017 11:09) (16 - 18)  SpO2: 94% (2017 11:09) (93% - 96%)  Daily     Daily Weight in k (2017 11:44)    General: Elderly female sitting up in bed, , pleasant, friendly, NAD  HEENT: mmm, perrl, eomi  Lungs: clear  Cardiac: +s1 s2, rrr, PPM site clean  GI: soft nt nd +bs  : voiding  Ext: left arm in sling, no edema  Neuro: no focal deficits    LABS:            Albumin: Albumin, Serum: 3.6 g/dL ( @ 20:48)      Allergies    penicillin (Rash)  timolol ophthalmic (Rash)    Intolerances      MEDICATIONS  (STANDING):  ATENolol  Tablet 50 milliGRAM(s) Oral daily    MEDICATIONS  (PRN):  LORazepam   Injectable 0.5 milliGRAM(s) IntraMuscular every 4 hours PRN Agitation  acetaminophen  Suppository. 650 milliGRAM(s) Rectal every 6 hours PRN Mild Pain (1 - 3)      RADIOLOGY:

## 2017-06-29 NOTE — DISCHARGE NOTE ADULT - MEDICATION SUMMARY - MEDICATIONS TO TAKE
I will START or STAY ON the medications listed below when I get home from the hospital:    acetaminophen 650 mg rectal suppository  -- 1 suppository(ies) rectally every 6 hours, As needed, Mild Pain (1 - 3)  -- Indication: For post PPM placement pain.    atenolol 50 mg oral tablet  -- 1 tab(s) by mouth once a day  -- Indication: For rate control and HTN.

## 2017-06-29 NOTE — DISCHARGE NOTE ADULT - MEDICATION SUMMARY - MEDICATIONS TO STOP TAKING
I will STOP taking the medications listed below when I get home from the hospital:    atenolol 50 mg oral tablet  --  by mouth 2 times a day    Avapro 75 mg oral tablet  -- 3 tab(s) by mouth once a day

## 2017-06-29 NOTE — DISCHARGE NOTE ADULT - CARE PROVIDERS DIRECT ADDRESSES
[email or web address]

## 2017-07-05 DIAGNOSIS — Z66 DO NOT RESUSCITATE: ICD-10-CM

## 2017-07-05 DIAGNOSIS — N28.9 DISORDER OF KIDNEY AND URETER, UNSPECIFIED: ICD-10-CM

## 2017-07-05 DIAGNOSIS — J98.4 OTHER DISORDERS OF LUNG: ICD-10-CM

## 2017-07-05 DIAGNOSIS — Z88.8 ALLERGY STATUS TO OTHER DRUGS, MEDICAMENTS AND BIOLOGICAL SUBSTANCES STATUS: ICD-10-CM

## 2017-07-05 DIAGNOSIS — R55 SYNCOPE AND COLLAPSE: ICD-10-CM

## 2017-07-05 DIAGNOSIS — I27.2 OTHER SECONDARY PULMONARY HYPERTENSION: ICD-10-CM

## 2017-07-05 DIAGNOSIS — Z91.041 RADIOGRAPHIC DYE ALLERGY STATUS: ICD-10-CM

## 2017-07-05 DIAGNOSIS — I44.0 ATRIOVENTRICULAR BLOCK, FIRST DEGREE: ICD-10-CM

## 2017-07-05 DIAGNOSIS — J90 PLEURAL EFFUSION, NOT ELSEWHERE CLASSIFIED: ICD-10-CM

## 2017-07-05 DIAGNOSIS — R64 CACHEXIA: ICD-10-CM

## 2017-07-05 DIAGNOSIS — Z88.1 ALLERGY STATUS TO OTHER ANTIBIOTIC AGENTS STATUS: ICD-10-CM

## 2017-07-05 DIAGNOSIS — I44.7 LEFT BUNDLE-BRANCH BLOCK, UNSPECIFIED: ICD-10-CM

## 2017-07-05 DIAGNOSIS — I08.1 RHEUMATIC DISORDERS OF BOTH MITRAL AND TRICUSPID VALVES: ICD-10-CM

## 2017-07-05 DIAGNOSIS — R41.0 DISORIENTATION, UNSPECIFIED: ICD-10-CM

## 2017-07-05 DIAGNOSIS — I44.2 ATRIOVENTRICULAR BLOCK, COMPLETE: ICD-10-CM

## 2017-07-05 DIAGNOSIS — M48.54XA COLLAPSED VERTEBRA, NOT ELSEWHERE CLASSIFIED, THORACIC REGION, INITIAL ENCOUNTER FOR FRACTURE: ICD-10-CM

## 2017-07-19 ENCOUNTER — APPOINTMENT (OUTPATIENT)
Dept: ELECTROPHYSIOLOGY | Facility: CLINIC | Age: 82
End: 2017-07-19

## 2017-07-19 VITALS
HEART RATE: 95 BPM | HEIGHT: 59 IN | WEIGHT: 90 LBS | DIASTOLIC BLOOD PRESSURE: 61 MMHG | SYSTOLIC BLOOD PRESSURE: 129 MMHG | BODY MASS INDEX: 18.14 KG/M2

## 2017-09-29 ENCOUNTER — APPOINTMENT (OUTPATIENT)
Dept: ELECTROPHYSIOLOGY | Facility: CLINIC | Age: 82
End: 2017-09-29
Payer: MEDICARE

## 2017-09-29 VITALS
HEART RATE: 90 BPM | HEIGHT: 59 IN | BODY MASS INDEX: 18.14 KG/M2 | SYSTOLIC BLOOD PRESSURE: 157 MMHG | DIASTOLIC BLOOD PRESSURE: 66 MMHG | WEIGHT: 90 LBS

## 2017-09-29 PROCEDURE — 93279 PRGRMG DEV EVAL PM/LDLS PM: CPT

## 2017-10-20 ENCOUNTER — APPOINTMENT (OUTPATIENT)
Dept: ELECTROPHYSIOLOGY | Facility: CLINIC | Age: 82
End: 2017-10-20
Payer: MEDICARE

## 2017-10-20 VITALS
BODY MASS INDEX: 18.14 KG/M2 | SYSTOLIC BLOOD PRESSURE: 161 MMHG | DIASTOLIC BLOOD PRESSURE: 81 MMHG | WEIGHT: 90 LBS | HEIGHT: 59 IN | HEART RATE: 91 BPM

## 2017-10-20 PROCEDURE — 93279 PRGRMG DEV EVAL PM/LDLS PM: CPT

## 2018-01-22 ENCOUNTER — APPOINTMENT (OUTPATIENT)
Dept: ELECTROPHYSIOLOGY | Facility: CLINIC | Age: 83
End: 2018-01-22
Payer: MEDICARE

## 2018-01-22 PROCEDURE — 93296 REM INTERROG EVL PM/IDS: CPT

## 2018-01-22 PROCEDURE — 93294 REM INTERROG EVL PM/LDLS PM: CPT

## 2018-04-23 ENCOUNTER — APPOINTMENT (OUTPATIENT)
Dept: ELECTROPHYSIOLOGY | Facility: CLINIC | Age: 83
End: 2018-04-23
Payer: MEDICARE

## 2018-04-23 PROCEDURE — 93294 REM INTERROG EVL PM/LDLS PM: CPT

## 2018-04-23 PROCEDURE — 93296 REM INTERROG EVL PM/IDS: CPT

## 2018-07-26 ENCOUNTER — APPOINTMENT (OUTPATIENT)
Dept: ELECTROPHYSIOLOGY | Facility: CLINIC | Age: 83
End: 2018-07-26
Payer: MEDICARE

## 2018-07-26 PROCEDURE — 93296 REM INTERROG EVL PM/IDS: CPT

## 2018-07-26 PROCEDURE — 93294 REM INTERROG EVL PM/LDLS PM: CPT

## 2018-10-25 ENCOUNTER — APPOINTMENT (OUTPATIENT)
Dept: ELECTROPHYSIOLOGY | Facility: CLINIC | Age: 83
End: 2018-10-25
Payer: MEDICARE

## 2018-10-25 VITALS
BODY MASS INDEX: 18.75 KG/M2 | WEIGHT: 93 LBS | HEIGHT: 59 IN | SYSTOLIC BLOOD PRESSURE: 153 MMHG | DIASTOLIC BLOOD PRESSURE: 62 MMHG | HEART RATE: 75 BPM

## 2018-10-25 PROBLEM — I10 ESSENTIAL (PRIMARY) HYPERTENSION: Chronic | Status: ACTIVE | Noted: 2017-06-25

## 2018-10-25 PROCEDURE — 93280 PM DEVICE PROGR EVAL DUAL: CPT

## 2018-10-25 PROCEDURE — 93279 PRGRMG DEV EVAL PM/LDLS PM: CPT | Mod: 59

## 2019-02-04 ENCOUNTER — APPOINTMENT (OUTPATIENT)
Dept: ELECTROPHYSIOLOGY | Facility: CLINIC | Age: 84
End: 2019-02-04
Payer: MEDICARE

## 2019-02-04 PROCEDURE — 93294 REM INTERROG EVL PM/LDLS PM: CPT

## 2019-02-04 PROCEDURE — 93296 REM INTERROG EVL PM/IDS: CPT

## 2019-05-06 ENCOUNTER — APPOINTMENT (OUTPATIENT)
Dept: ELECTROPHYSIOLOGY | Facility: CLINIC | Age: 84
End: 2019-05-06
Payer: MEDICARE

## 2019-05-06 PROCEDURE — 93296 REM INTERROG EVL PM/IDS: CPT

## 2019-05-06 PROCEDURE — 93294 REM INTERROG EVL PM/LDLS PM: CPT

## 2019-06-20 ENCOUNTER — APPOINTMENT (OUTPATIENT)
Dept: CARDIOLOGY | Facility: CLINIC | Age: 84
End: 2019-06-20
Payer: MEDICARE

## 2019-06-20 VITALS
WEIGHT: 95 LBS | RESPIRATION RATE: 12 BRPM | TEMPERATURE: 97.2 F | DIASTOLIC BLOOD PRESSURE: 69 MMHG | BODY MASS INDEX: 19.15 KG/M2 | SYSTOLIC BLOOD PRESSURE: 152 MMHG | HEIGHT: 59 IN | HEART RATE: 77 BPM | OXYGEN SATURATION: 98 %

## 2019-06-20 PROCEDURE — 93000 ELECTROCARDIOGRAM COMPLETE: CPT

## 2019-06-20 PROCEDURE — 99214 OFFICE O/P EST MOD 30 MIN: CPT | Mod: 25

## 2019-06-24 ENCOUNTER — RECORD ABSTRACTING (OUTPATIENT)
Age: 84
End: 2019-06-24

## 2019-06-24 RX ORDER — ATENOLOL 50 MG/1
50 TABLET ORAL DAILY
Refills: 0 | Status: ACTIVE | COMMUNITY

## 2019-06-24 RX ORDER — ASPIRIN 81 MG
81 TABLET, DELAYED RELEASE (ENTERIC COATED) ORAL DAILY
Refills: 3 | Status: ACTIVE | COMMUNITY

## 2019-08-12 ENCOUNTER — APPOINTMENT (OUTPATIENT)
Dept: ELECTROPHYSIOLOGY | Facility: CLINIC | Age: 84
End: 2019-08-12
Payer: MEDICARE

## 2019-08-12 PROCEDURE — 93296 REM INTERROG EVL PM/IDS: CPT

## 2019-08-12 PROCEDURE — 93294 REM INTERROG EVL PM/LDLS PM: CPT

## 2019-09-26 ENCOUNTER — NON-APPOINTMENT (OUTPATIENT)
Age: 84
End: 2019-09-26

## 2019-09-26 ENCOUNTER — APPOINTMENT (OUTPATIENT)
Dept: CARDIOLOGY | Facility: CLINIC | Age: 84
End: 2019-09-26
Payer: MEDICARE

## 2019-09-26 VITALS
SYSTOLIC BLOOD PRESSURE: 155 MMHG | OXYGEN SATURATION: 97 % | HEIGHT: 59 IN | WEIGHT: 96 LBS | BODY MASS INDEX: 19.35 KG/M2 | HEART RATE: 81 BPM | RESPIRATION RATE: 14 BRPM | DIASTOLIC BLOOD PRESSURE: 80 MMHG

## 2019-09-26 PROCEDURE — 93000 ELECTROCARDIOGRAM COMPLETE: CPT

## 2019-09-26 PROCEDURE — 99214 OFFICE O/P EST MOD 30 MIN: CPT | Mod: 25

## 2019-09-26 NOTE — REASON FOR VISIT
[FreeTextEntry1] : 87 yo female presents in follow up for LBBB, PPm and HTN. Pt denies chest pain or shortness of breath. Labs and testing previously performed were reviewed.

## 2019-09-26 NOTE — DISCUSSION/SUMMARY
[FreeTextEntry1] : Pt will have PPM follow up with Dr. Leigh. Pt will continue current medications.

## 2019-09-26 NOTE — PHYSICAL EXAM
[Normal Appearance] : normal appearance [General Appearance - Well Developed] : well developed [General Appearance - Well Nourished] : well nourished [No Deformities] : no deformities [Well Groomed] : well groomed [Normal Conjunctiva] : the conjunctiva exhibited no abnormalities [General Appearance - In No Acute Distress] : no acute distress [Normal Oral Mucosa] : normal oral mucosa [Eyelids - No Xanthelasma] : the eyelids demonstrated no xanthelasmas [No Oral Cyanosis] : no oral cyanosis [No Oral Pallor] : no oral pallor [Normal Jugular Venous V Waves Present] : normal jugular venous V waves present [No Jugular Venous Badillo A Waves] : no jugular venous badillo A waves [Normal Jugular Venous A Waves Present] : normal jugular venous A waves present [Exaggerated Use Of Accessory Muscles For Inspiration] : no accessory muscle use [Respiration, Rhythm And Depth] : normal respiratory rhythm and effort [Heart Sounds] : normal S1 and S2 [Heart Rate And Rhythm] : heart rate and rhythm were normal [Auscultation Breath Sounds / Voice Sounds] : lungs were clear to auscultation bilaterally [Murmurs] : no murmurs present [Abdomen Soft] : soft [Abdomen Tenderness] : non-tender [Abdomen Mass (___ Cm)] : no abdominal mass palpated [Abnormal Walk] : normal gait [Gait - Sufficient For Exercise Testing] : the gait was sufficient for exercise testing [Nail Clubbing] : no clubbing of the fingernails [Petechial Hemorrhages (___cm)] : no petechial hemorrhages [Cyanosis, Localized] : no localized cyanosis [Skin Color & Pigmentation] : normal skin color and pigmentation [] : no rash [Skin Lesions] : no skin lesions [No Venous Stasis] : no venous stasis [No Skin Ulcers] : no skin ulcer [No Xanthoma] : no  xanthoma was observed [Oriented To Time, Place, And Person] : oriented to person, place, and time [Affect] : the affect was normal [Mood] : the mood was normal [No Anxiety] : not feeling anxious

## 2019-10-25 ENCOUNTER — APPOINTMENT (OUTPATIENT)
Dept: ELECTROPHYSIOLOGY | Facility: CLINIC | Age: 84
End: 2019-10-25
Payer: MEDICARE

## 2019-10-25 VITALS
HEIGHT: 59 IN | SYSTOLIC BLOOD PRESSURE: 152 MMHG | WEIGHT: 90 LBS | OXYGEN SATURATION: 99 % | HEART RATE: 81 BPM | DIASTOLIC BLOOD PRESSURE: 77 MMHG | BODY MASS INDEX: 18.14 KG/M2

## 2019-10-25 PROCEDURE — 93279 PRGRMG DEV EVAL PM/LDLS PM: CPT

## 2019-10-25 RX ORDER — UBIDECARENONE/VIT E ACET 100MG-5
CAPSULE ORAL
Refills: 0 | Status: ACTIVE | COMMUNITY

## 2019-10-25 RX ORDER — ACETAMINOPHEN 325 MG/1
325 TABLET, FILM COATED ORAL
Refills: 0 | Status: ACTIVE | COMMUNITY

## 2020-01-21 ENCOUNTER — APPOINTMENT (OUTPATIENT)
Dept: ELECTROPHYSIOLOGY | Facility: CLINIC | Age: 85
End: 2020-01-21
Payer: MEDICARE

## 2020-01-21 DIAGNOSIS — Z95.0 PRESENCE OF CARDIAC PACEMAKER: ICD-10-CM

## 2020-01-21 DIAGNOSIS — I49.5 SICK SINUS SYNDROME: ICD-10-CM

## 2020-01-21 PROCEDURE — 93294 REM INTERROG EVL PM/LDLS PM: CPT

## 2020-01-21 PROCEDURE — 93296 REM INTERROG EVL PM/IDS: CPT

## 2020-01-23 ENCOUNTER — NON-APPOINTMENT (OUTPATIENT)
Age: 85
End: 2020-01-23

## 2020-01-23 ENCOUNTER — APPOINTMENT (OUTPATIENT)
Dept: CARDIOLOGY | Facility: CLINIC | Age: 85
End: 2020-01-23
Payer: MEDICARE

## 2020-01-23 VITALS
DIASTOLIC BLOOD PRESSURE: 78 MMHG | BODY MASS INDEX: 18.95 KG/M2 | SYSTOLIC BLOOD PRESSURE: 144 MMHG | HEIGHT: 59 IN | OXYGEN SATURATION: 98 % | HEART RATE: 97 BPM | WEIGHT: 94 LBS

## 2020-01-23 PROCEDURE — 99214 OFFICE O/P EST MOD 30 MIN: CPT

## 2020-01-23 PROCEDURE — 93000 ELECTROCARDIOGRAM COMPLETE: CPT

## 2020-01-23 NOTE — PHYSICAL EXAM
[General Appearance - Well Developed] : well developed [Normal Appearance] : normal appearance [Well Groomed] : well groomed [General Appearance - Well Nourished] : well nourished [No Deformities] : no deformities [General Appearance - In No Acute Distress] : no acute distress [Normal Conjunctiva] : the conjunctiva exhibited no abnormalities [] : no respiratory distress [Respiration, Rhythm And Depth] : normal respiratory rhythm and effort [Exaggerated Use Of Accessory Muscles For Inspiration] : no accessory muscle use [Auscultation Breath Sounds / Voice Sounds] : lungs were clear to auscultation bilaterally [Heart Rate And Rhythm] : heart rate and rhythm were normal [Heart Sounds] : normal S1 and S2 [Abdomen Soft] : soft [Skin Color & Pigmentation] : normal skin color and pigmentation [Oriented To Time, Place, And Person] : oriented to person, place, and time [FreeTextEntry1] : +1 LE Edema B/L ( Chronic )

## 2020-01-23 NOTE — DISCUSSION/SUMMARY
[FreeTextEntry1] : Abnormal EKG LBBB chronic\par \par PPM last interrogated 10/2019 followed with EP\par \par HTN: Controlled \par \par Will obtain copy of labs will return fro Echocardiogram \par \par OV 3 months

## 2020-01-23 NOTE — HISTORY OF PRESENT ILLNESS
[FreeTextEntry1] : 89 Y/O female PMH: Abnormal EKG LBBB,  PPM followed with EP,  HTN who presents today in routine cardiac F/U \par \par Pt denies chest pain or shortness of breath. Labs with PCP

## 2020-02-07 PROBLEM — Z95.0 HISTORY OF PERMANENT CARDIAC PACEMAKER PLACEMENT: Status: ACTIVE | Noted: 2017-07-19

## 2020-02-07 PROBLEM — I49.5 SICK SINUS SYNDROME: Status: ACTIVE | Noted: 2017-07-19

## 2020-04-16 ENCOUNTER — APPOINTMENT (OUTPATIENT)
Dept: CARDIOLOGY | Facility: CLINIC | Age: 85
End: 2020-04-16

## 2020-04-23 ENCOUNTER — APPOINTMENT (OUTPATIENT)
Dept: CARDIOLOGY | Facility: CLINIC | Age: 85
End: 2020-04-23

## 2020-04-27 ENCOUNTER — APPOINTMENT (OUTPATIENT)
Dept: ELECTROPHYSIOLOGY | Facility: CLINIC | Age: 85
End: 2020-04-27
Payer: MEDICARE

## 2020-04-27 PROCEDURE — 93294 REM INTERROG EVL PM/LDLS PM: CPT

## 2020-04-27 PROCEDURE — 93296 REM INTERROG EVL PM/IDS: CPT

## 2020-05-07 ENCOUNTER — APPOINTMENT (OUTPATIENT)
Dept: CARDIOLOGY | Facility: CLINIC | Age: 85
End: 2020-05-07

## 2020-06-04 ENCOUNTER — APPOINTMENT (OUTPATIENT)
Dept: CARDIOLOGY | Facility: CLINIC | Age: 85
End: 2020-06-04

## 2020-07-09 ENCOUNTER — APPOINTMENT (OUTPATIENT)
Dept: CARDIOLOGY | Facility: CLINIC | Age: 85
End: 2020-07-09

## 2020-08-04 ENCOUNTER — APPOINTMENT (OUTPATIENT)
Dept: ELECTROPHYSIOLOGY | Facility: CLINIC | Age: 85
End: 2020-08-04

## 2020-09-01 ENCOUNTER — APPOINTMENT (OUTPATIENT)
Dept: ELECTROPHYSIOLOGY | Facility: CLINIC | Age: 85
End: 2020-09-01
Payer: MEDICARE

## 2020-09-02 PROCEDURE — 93294 REM INTERROG EVL PM/LDLS PM: CPT

## 2020-09-02 PROCEDURE — 93296 REM INTERROG EVL PM/IDS: CPT

## 2020-10-27 ENCOUNTER — APPOINTMENT (OUTPATIENT)
Dept: CARDIOLOGY | Facility: CLINIC | Age: 85
End: 2020-10-27

## 2020-10-29 ENCOUNTER — APPOINTMENT (OUTPATIENT)
Dept: ELECTROPHYSIOLOGY | Facility: CLINIC | Age: 85
End: 2020-10-29

## 2021-01-31 ENCOUNTER — APPOINTMENT (OUTPATIENT)
Dept: ELECTROPHYSIOLOGY | Facility: CLINIC | Age: 86
End: 2021-01-31
Payer: MEDICARE

## 2021-02-01 PROCEDURE — 93296 REM INTERROG EVL PM/IDS: CPT

## 2021-02-01 PROCEDURE — 93294 REM INTERROG EVL PM/LDLS PM: CPT

## 2021-02-02 ENCOUNTER — NON-APPOINTMENT (OUTPATIENT)
Age: 86
End: 2021-02-02

## 2021-05-02 ENCOUNTER — APPOINTMENT (OUTPATIENT)
Dept: ELECTROPHYSIOLOGY | Facility: CLINIC | Age: 86
End: 2021-05-02
Payer: MEDICARE

## 2021-05-03 ENCOUNTER — NON-APPOINTMENT (OUTPATIENT)
Age: 86
End: 2021-05-03

## 2021-05-03 PROCEDURE — 93296 REM INTERROG EVL PM/IDS: CPT

## 2021-05-03 PROCEDURE — 93294 REM INTERROG EVL PM/LDLS PM: CPT

## 2021-08-01 ENCOUNTER — APPOINTMENT (OUTPATIENT)
Dept: ELECTROPHYSIOLOGY | Facility: CLINIC | Age: 86
End: 2021-08-01
Payer: MEDICARE

## 2021-08-02 ENCOUNTER — NON-APPOINTMENT (OUTPATIENT)
Age: 86
End: 2021-08-02

## 2021-08-02 PROCEDURE — 93294 REM INTERROG EVL PM/LDLS PM: CPT

## 2021-08-02 PROCEDURE — 93296 REM INTERROG EVL PM/IDS: CPT

## 2021-10-24 ENCOUNTER — FORM ENCOUNTER (OUTPATIENT)
Age: 86
End: 2021-10-24

## 2021-11-03 ENCOUNTER — NON-APPOINTMENT (OUTPATIENT)
Age: 86
End: 2021-11-03

## 2021-11-03 ENCOUNTER — APPOINTMENT (OUTPATIENT)
Dept: ELECTROPHYSIOLOGY | Facility: CLINIC | Age: 86
End: 2021-11-03
Payer: MEDICARE

## 2021-11-03 PROCEDURE — 93294 REM INTERROG EVL PM/LDLS PM: CPT | Mod: NC

## 2021-11-03 PROCEDURE — 93296 REM INTERROG EVL PM/IDS: CPT | Mod: NC

## 2022-02-01 ENCOUNTER — APPOINTMENT (OUTPATIENT)
Dept: ELECTROPHYSIOLOGY | Facility: CLINIC | Age: 87
End: 2022-02-01
Payer: MEDICARE

## 2022-02-02 ENCOUNTER — NON-APPOINTMENT (OUTPATIENT)
Age: 87
End: 2022-02-02

## 2022-02-02 PROCEDURE — 93294 REM INTERROG EVL PM/LDLS PM: CPT

## 2022-02-02 PROCEDURE — 93296 REM INTERROG EVL PM/IDS: CPT

## 2022-04-08 ENCOUNTER — INPATIENT (INPATIENT)
Facility: HOSPITAL | Age: 87
LOS: 6 days | Discharge: NURSING, REHAB OR LONG-TERM CARE | DRG: 884 | End: 2022-04-15
Attending: FAMILY MEDICINE | Admitting: HOSPITALIST
Payer: MEDICARE

## 2022-04-08 VITALS
OXYGEN SATURATION: 100 % | HEIGHT: 60 IN | TEMPERATURE: 97 F | RESPIRATION RATE: 18 BRPM | HEART RATE: 60 BPM | DIASTOLIC BLOOD PRESSURE: 53 MMHG | WEIGHT: 134.92 LBS | SYSTOLIC BLOOD PRESSURE: 166 MMHG

## 2022-04-08 DIAGNOSIS — Z95.0 PRESENCE OF CARDIAC PACEMAKER: Chronic | ICD-10-CM

## 2022-04-08 DIAGNOSIS — Z90.49 ACQUIRED ABSENCE OF OTHER SPECIFIED PARTS OF DIGESTIVE TRACT: Chronic | ICD-10-CM

## 2022-04-08 LAB
ANION GAP SERPL CALC-SCNC: 7 MMOL/L — SIGNIFICANT CHANGE UP (ref 5–17)
APPEARANCE UR: CLEAR — SIGNIFICANT CHANGE UP
BASOPHILS # BLD AUTO: 0.04 K/UL — SIGNIFICANT CHANGE UP (ref 0–0.2)
BASOPHILS NFR BLD AUTO: 0.5 % — SIGNIFICANT CHANGE UP (ref 0–2)
BILIRUB UR-MCNC: NEGATIVE — SIGNIFICANT CHANGE UP
BUN SERPL-MCNC: 19 MG/DL — SIGNIFICANT CHANGE UP (ref 7–23)
CALCIUM SERPL-MCNC: 9.3 MG/DL — SIGNIFICANT CHANGE UP (ref 8.5–10.1)
CHLORIDE SERPL-SCNC: 102 MMOL/L — SIGNIFICANT CHANGE UP (ref 96–108)
CO2 SERPL-SCNC: 29 MMOL/L — SIGNIFICANT CHANGE UP (ref 22–31)
COLOR SPEC: YELLOW — SIGNIFICANT CHANGE UP
CREAT SERPL-MCNC: 0.64 MG/DL — SIGNIFICANT CHANGE UP (ref 0.5–1.3)
DIFF PNL FLD: NEGATIVE — SIGNIFICANT CHANGE UP
EGFR: 84 ML/MIN/1.73M2 — SIGNIFICANT CHANGE UP
EOSINOPHIL # BLD AUTO: 0.02 K/UL — SIGNIFICANT CHANGE UP (ref 0–0.5)
EOSINOPHIL NFR BLD AUTO: 0.2 % — SIGNIFICANT CHANGE UP (ref 0–6)
ERYTHROCYTE [SEDIMENTATION RATE] IN BLOOD: 6 MM/HR — SIGNIFICANT CHANGE UP (ref 0–20)
GLUCOSE SERPL-MCNC: 122 MG/DL — HIGH (ref 70–99)
GLUCOSE UR QL: NEGATIVE — SIGNIFICANT CHANGE UP
HCT VFR BLD CALC: 44 % — SIGNIFICANT CHANGE UP (ref 34.5–45)
HGB BLD-MCNC: 14.8 G/DL — SIGNIFICANT CHANGE UP (ref 11.5–15.5)
IMM GRANULOCYTES NFR BLD AUTO: 0.3 % — SIGNIFICANT CHANGE UP (ref 0–1.5)
KETONES UR-MCNC: ABNORMAL
LEUKOCYTE ESTERASE UR-ACNC: NEGATIVE — SIGNIFICANT CHANGE UP
LYMPHOCYTES # BLD AUTO: 1 K/UL — SIGNIFICANT CHANGE UP (ref 1–3.3)
LYMPHOCYTES # BLD AUTO: 11.3 % — LOW (ref 13–44)
MCHC RBC-ENTMCNC: 32.2 PG — SIGNIFICANT CHANGE UP (ref 27–34)
MCHC RBC-ENTMCNC: 33.6 GM/DL — SIGNIFICANT CHANGE UP (ref 32–36)
MCV RBC AUTO: 95.9 FL — SIGNIFICANT CHANGE UP (ref 80–100)
MONOCYTES # BLD AUTO: 0.78 K/UL — SIGNIFICANT CHANGE UP (ref 0–0.9)
MONOCYTES NFR BLD AUTO: 8.8 % — SIGNIFICANT CHANGE UP (ref 2–14)
NEUTROPHILS # BLD AUTO: 6.96 K/UL — SIGNIFICANT CHANGE UP (ref 1.8–7.4)
NEUTROPHILS NFR BLD AUTO: 78.9 % — HIGH (ref 43–77)
NITRITE UR-MCNC: NEGATIVE — SIGNIFICANT CHANGE UP
PH UR: 6 — SIGNIFICANT CHANGE UP (ref 5–8)
PLATELET # BLD AUTO: 191 K/UL — SIGNIFICANT CHANGE UP (ref 150–400)
POTASSIUM SERPL-MCNC: 4.3 MMOL/L — SIGNIFICANT CHANGE UP (ref 3.5–5.3)
POTASSIUM SERPL-SCNC: 4.3 MMOL/L — SIGNIFICANT CHANGE UP (ref 3.5–5.3)
PROT UR-MCNC: 15
RBC # BLD: 4.59 M/UL — SIGNIFICANT CHANGE UP (ref 3.8–5.2)
RBC # FLD: 13.9 % — SIGNIFICANT CHANGE UP (ref 10.3–14.5)
SODIUM SERPL-SCNC: 138 MMOL/L — SIGNIFICANT CHANGE UP (ref 135–145)
SP GR SPEC: 1.01 — SIGNIFICANT CHANGE UP (ref 1.01–1.02)
TSH SERPL-MCNC: 1.41 UU/ML — SIGNIFICANT CHANGE UP (ref 0.34–4.82)
UROBILINOGEN FLD QL: NEGATIVE — SIGNIFICANT CHANGE UP
WBC # BLD: 8.83 K/UL — SIGNIFICANT CHANGE UP (ref 3.8–10.5)
WBC # FLD AUTO: 8.83 K/UL — SIGNIFICANT CHANGE UP (ref 3.8–10.5)

## 2022-04-08 PROCEDURE — 71045 X-RAY EXAM CHEST 1 VIEW: CPT | Mod: 26

## 2022-04-08 PROCEDURE — 73521 X-RAY EXAM HIPS BI 2 VIEWS: CPT | Mod: 26

## 2022-04-08 PROCEDURE — 73560 X-RAY EXAM OF KNEE 1 OR 2: CPT | Mod: 26,LT

## 2022-04-08 PROCEDURE — 70450 CT HEAD/BRAIN W/O DYE: CPT | Mod: 26,MA

## 2022-04-08 PROCEDURE — 99285 EMERGENCY DEPT VISIT HI MDM: CPT

## 2022-04-08 RX ORDER — SODIUM CHLORIDE 9 MG/ML
1000 INJECTION INTRAMUSCULAR; INTRAVENOUS; SUBCUTANEOUS ONCE
Refills: 0 | Status: COMPLETED | OUTPATIENT
Start: 2022-04-08 | End: 2022-04-08

## 2022-04-08 RX ORDER — AMLODIPINE BESYLATE 2.5 MG/1
2.5 TABLET ORAL DAILY
Refills: 0 | Status: DISCONTINUED | OUTPATIENT
Start: 2022-04-08 | End: 2022-04-08

## 2022-04-08 RX ORDER — AMLODIPINE BESYLATE 2.5 MG/1
2.5 TABLET ORAL ONCE
Refills: 0 | Status: COMPLETED | OUTPATIENT
Start: 2022-04-08 | End: 2022-04-08

## 2022-04-08 RX ORDER — ATENOLOL 25 MG/1
50 TABLET ORAL DAILY
Refills: 0 | Status: DISCONTINUED | OUTPATIENT
Start: 2022-04-08 | End: 2022-04-12

## 2022-04-08 RX ORDER — AMLODIPINE BESYLATE 2.5 MG/1
5 TABLET ORAL DAILY
Refills: 0 | Status: DISCONTINUED | OUTPATIENT
Start: 2022-04-08 | End: 2022-04-15

## 2022-04-08 RX ORDER — SPIRONOLACTONE 25 MG/1
25 TABLET, FILM COATED ORAL DAILY
Refills: 0 | Status: DISCONTINUED | OUTPATIENT
Start: 2022-04-08 | End: 2022-04-08

## 2022-04-08 RX ADMIN — AMLODIPINE BESYLATE 2.5 MILLIGRAM(S): 2.5 TABLET ORAL at 20:50

## 2022-04-08 RX ADMIN — SODIUM CHLORIDE 1000 MILLILITER(S): 9 INJECTION INTRAMUSCULAR; INTRAVENOUS; SUBCUTANEOUS at 17:56

## 2022-04-08 NOTE — ED PROVIDER NOTE - NSICDXPASTMEDICALHX_GEN_ALL_CORE_FT
PAST MEDICAL HISTORY:  History of left bundle branch block (LBBB)     HTN (hypertension)     SSS (sick sinus syndrome)

## 2022-04-08 NOTE — H&P ADULT - NSHPSOCIALHISTORY_GEN_ALL_CORE
Denies tobacco use  Denies alcohol use Pt is  20 yrs and has 2 daughters.  Pt lives alone and has an Aide, Debbie.   Denies tobacco use  Denies alcohol /drug use

## 2022-04-08 NOTE — H&P ADULT - NSHPOUTPATIENTPROVIDERS_GEN_ALL_CORE
PCP: Dr. Charlene Barrow; Cardiologist: Dr. Graves PCP: Dr. Charlene Barrow; Cardiologist: Dr. Cardenas

## 2022-04-08 NOTE — ED PROVIDER NOTE - CLINICAL SUMMARY MEDICAL DECISION MAKING FREE TEXT BOX
89 y/o female with a PMHx of LBBB, HTN, SSS treated with pacemaker presents to the ED via private car c/o generalized weakness, gradual physical decline, increased difficulty ambulating/unsteady, and intermittent palpitations. No fevers, chills, CP, or SOB. No fall. VSS. Plan: EKG, CT head, CXR, labs including BGM, COVID-19 swab, urine with culture & troponin, orthostatic BP pulse, rectal temp, fall precautions, ambulation trial if initial studies negative, observe, monitor, and reassess.

## 2022-04-08 NOTE — H&P ADULT - NSHPPHYSICALEXAM_GEN_ALL_CORE
Vitals  T(F): 97.4 (04-08-22 @ 13:35), Max: 97.4 (04-08-22 @ 13:35)  HR: 60 (04-08-22 @ 20:52) (60 - 60)  BP: 137/49 (04-08-22 @ 20:52) (137/49 - 166/53)  RR: 18 (04-08-22 @ 20:52) (14 - 18)  SpO2: 93% (04-08-22 @ 20:52) (93% - 100%)    PHYSICAL EXAM   Gen: NAD, comfortable, AA&Ox3  HEENT: head atrumatic and normocephalic, PEERLA, EOMI,  no gross abnormalities of ears, mucous membranes moist, no oral lesions, no evidence of temporal artery tenderness  CVS: +s1, s2, regular rate and rhythm, no murmurs, rubs or gallops, no thrill, normal PMI  Pulmonary: normal respiratory effort, clear to auscultation b/l, no wheezes/crackles/rhonchi  Abdomen: soft, non-tender, non-distended, +bowel sounds in all 4 quadrants, no masses noted, no guarding or rigidity   Back: no scoliosis, lordosis, or kyphosis, no point tenderness, no CVA tenderness   Extremities: 2+ b/l pitting edema. +L knee edema.   Skin: warm and dry, no wounds. reddish brown discoloration of b/l LE  Neuro: answering questions appropriately, face symmetric, sensation equal bilaterally in face, tongue midline, no dysarthria, 5/5 strength in upper and lower extremities bilaterally, sensation intact in upper and lower extremities bilaterally.

## 2022-04-08 NOTE — H&P ADULT - CONVERSATION DETAILS
CHUCKY completed in ED with pt. Pt states she is DNR/DNI,  does not want any BIPAP trial or feeding tube. She states her daughter Jodee in  would be her HCP,  followed by her daughter Kay in [place].

## 2022-04-08 NOTE — ED PROVIDER NOTE - GASTROINTESTINAL, MLM
Abdomen soft, non-tender, no guarding. Bowel sounds positive; normal pitch. No flank or CVA tenderness.

## 2022-04-08 NOTE — H&P ADULT - ATTENDING COMMENTS
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  Pt is admitted w/ weakness, difficulty ambulating and increased edema.   She was recently started on aldactone.   - check albumin and BENITA  - physical therapy  - outpt follow up

## 2022-04-08 NOTE — ED ADULT NURSE REASSESSMENT NOTE - NS ED NURSE REASSESS COMMENT FT1
Pt resting comfortable in stretcher. Vital signs stable. Patient safety maintained. Administered amlodipine as per order. Called Phlebotomist to draw additional labs. Pt to be admitted to med/surg for observation. Will continue to monitor.

## 2022-04-08 NOTE — H&P ADULT - HISTORY OF PRESENT ILLNESS
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 Pt states aide rang her doorbell today but was too weak to get to the door. Aide eventually opened the door herself and found pt to be looking pale and shaking all over. Pt denies LOC and remembers the whole event. Pt states her legs have gotten progressively to the point she feels like she is shuffling her feet with her walker. Pt states she has associated chills, hip stiffness and left shoulder stiffness. Pt also reports she was recently started on a diuretic for swelling in her legs that has been going on for months but also have gotten progressively worse over the past few weeks. Denies CP, SOB, orthopnea, cough, N/V/D, fevers, dysuria.     In the ED, vitals T97.4F, HR60, /53, RR18, satting 100% on RA.   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 Pt states aide rang her doorbell today but was too weak to get to the door. Aide eventually opened the door herself and found pt to be looking pale and shaking all over. Pt denies LOC and remembers the whole event. Pt states her legs have gotten progressively to the point she feels like she is shuffling her feet with her walker. Pt states she has associated chills, hip stiffness and left shoulder stiffness. Pt also reports she was recently started on a diuretic a week ago for swelling in her legs that has been going on for months but also have gotten progressively worse over the past few weeks. Denies CP, SOB, orthopnea, cough, N/V/D, fevers, dysuria.     In the ED, vitals T97.4F, HR60, /53, RR18, satting 100% on RA.

## 2022-04-08 NOTE — ED PROVIDER NOTE - ATTENDING CONTRIBUTION TO CARE
Dr. De Anda: I have personally performed a face to face bedside history and physical examination of this patient. I have discussed the history, examination, review of systems, assessment and plan of management with the resident. I have reviewed the electronic medical record and amended it to reflect my history, review of systems, physical exam, assessment and plan.

## 2022-04-08 NOTE — H&P ADULT - ASSESSMENT
89F with PMHx as above presents for generalized weakness.    #Generalized Weakness   -In the ED, T97.4F, HR60, /53, RR18, WBC 8.83  -UA unremarkable  -CXR awaiting final read though no evidence of consolidations on personal review  -CT Head shows moderate atrophy. No evidence of hemorrhage. No change since 6/2017     89F with PMHx as above presents for generalized weakness.    #Generalized Weakness   -In the ED, T97.4F, HR60, /53, RR18, WBC 8.83  -UA unremarkable  -CXR awaiting final read though no evidence of consolidations on personal review  -CT Head shows moderate atrophy. No evidence of hemorrhage. No change since 6/2017  -TSH pending  -DANIELLE pending  -Likely 2/2 aldactone    #Left Knee Effusion  -f/u L Knee XR    #Bilateral Hip stiffness  -Likely 2/2 OA  -F/U b/l hip XR    #HTN  -Cont atenolol 50mg PO QD  -Cont Norvasc 2.5mg PO QD    Case discussed with Dr Haynes      89F with PMHx as above presents for generalized weakness.    #Generalized Weakness   -In the ED, T97.4F, HR60, /53, RR18, WBC 8.83  -UA unremarkable  -CXR awaiting final read though no evidence of consolidations on personal review  -CT Head shows moderate atrophy. No evidence of hemorrhage. No change since 6/2017  -TSH pending  -DANIELLE pending  -Likely 2/2 recent aldactone use    #Left Knee Effusion  -f/u L Knee XR    #Bilateral Hip stiffness  -Likely 2/2 OA  -F/U b/l hip XR    #HTN  -Cont atenolol 50mg PO QD  -Cont Norvasc 2.5mg PO QD    Case discussed with Dr Haynes      89F with PMHx as above presents for generalized weakness.    #Generalized Weakness   -In the ED, T97.4F, HR60, /53, RR18, WBC 8.83  -UA unremarkable  -CXR awaiting final read though no evidence of consolidations on personal review  -CT Head shows moderate atrophy. No evidence of hemorrhage. No change since 6/2017  -TSH pending  -DANIELLE pending  -Trops 38.38  -Likely 2/2 recent aldactone use    #Left Knee Effusion  -f/u L Knee XR    #Bilateral Hip stiffness  -Likely 2/2 OA  -F/U b/l hip XR    #HTN  -Cont atenolol 50mg PO QD  -Cont Norvasc 2.5mg PO QD    Case discussed with Dr Haynes      89F with PMHx as above presents for generalized weakness.    #Generalized Weakness   -In the ED, T97.4F, HR60, /53, RR18, WBC 8.83  -UA unremarkable  -CXR awaiting final read though no evidence of consolidations on personal review  -CT Head shows moderate atrophy. No evidence of hemorrhage. No change since 6/2017  -TSH pending  -DANIELLE pending  -Trops 38.38  -Likely 2/2 recent aldactone use    #Left Knee Effusion  -f/u L Knee XR  -f/u L Knee US  -Likely causing pt's ataxia    #Bilateral Hip stiffness  -Likely 2/2 OA  -F/U b/l hip XR    #HTN  -Cont atenolol 50mg PO QD  -Cont Norvasc 2.5mg PO QD    Case discussed with Dr Haynes      89F with PMHx as above presents for generalized weakness.    #Generalized Weakness   -In the ED, T97.4F, HR60, /53, RR18, WBC 8.83  -UA unremarkable  -CXR awaiting final read though no evidence of consolidations on personal review  -CT Head shows moderate atrophy. No evidence of hemorrhage. No change since 6/2017  -TSH pending  -DANIELLE pending  -Trops 38.38  -Likely 2/2 recent aldactone use  -PT eval    #Left Knee Effusion  -f/u L Knee XR  -f/u L Knee US  -Likely causing pt's ataxia    #Bilateral Hip stiffness  -Likely 2/2 OA  -F/U b/l hip XR    #HTN  -Cont atenolol 50mg PO QD  -Cont Norvasc 2.5mg PO QD    #DVT PPX  -Ambulation    #DNR/DNI - prior MOLST    Case discussed with Dr Haynes      89F with PMHx as above presents for generalized weakness.    #Generalized Weakness   -In the ED, T97.4F, HR60, /53, RR18, WBC 8.83  -UA unremarkable  -CXR awaiting final read though no evidence of consolidations on personal review  -CT Head shows moderate atrophy. No evidence of hemorrhage. No change since 6/2017  -TSH pending  -DANIELLE pending  -Trops 38.38  -Likely 2/2 recent aldactone use  -PT eval    #Bilateral LE edema  -Likely 2/2 venous congestion  -Will check TTE  -CMP in AM to check albumin    #Left Knee Effusion  -f/u L Knee XR  -f/u L Knee US  -Likely causing pt's ataxia    #Bilateral Hip stiffness  -Likely 2/2 OA  -F/U b/l hip XR    #HTN  -Cont atenolol 50mg PO QD  -Cont Norvasc 2.5mg PO QD    #DVT PPX  -Ambulation    #DNR/DNI - prior MOLST    Case discussed with Dr Haynes

## 2022-04-08 NOTE — ED PROVIDER NOTE - CARDIAC, MLM
Normal rate, regular rhythm. Normal radial pulse. Heart sounds S1, S2.  No murmurs, rubs or gallops.

## 2022-04-08 NOTE — H&P ADULT - NSHPREVIEWOFSYSTEMS_GEN_ALL_CORE
Review of Symptoms  Gen: no fevers, +chills, no sweats, no weight loss, +fatigue  Cardiovascular: no chest pain, no palpitations, no orthopnea, +leg swelling  Respiratory: no shortness of breath, no exertional dyspnea, no cough, no rhinorrhea, no nasal congestion  GI: no difficulty swallowing, no nausea, no vomiting, no abdominal pain, no diarrhea, no constipation, no melana  : no dysuria, no increased freq, no hematuria, no malodorous urine  Derm: no wounds, no rashes  Heme: no easy bleeding or bruising  MSK: no joint pain, no joint swelling or redness, no extremity pain, +hip stiffness, +left shoulder stiffness  Neuro: no numbness, +weakness, no memory loss  Psych: no depression, no anxiety, no SI

## 2022-04-08 NOTE — ED PROVIDER NOTE - PROGRESS NOTE DETAILS
Attempted to call patient's visitng doctor. No answer, message left. Found to have + orthostatics likely secondary to patient's recent diuretic usage. Spoke with patient's doctor Pushpa. States that she started patient on spironolactone. Discussed patient's results of labwork, exam, and orthostatics in the ED. She stated that if possible patient should avoid admission. Dr. Barrow stated that she would promptly follow up with patient and that she could hold the spironolactone until their next appt. C MD Neetu:  Informed by ED RN that pt failed ambulation trial.  EM resident d/w admitting hospitalist for admission.

## 2022-04-08 NOTE — ED ADULT NURSE NOTE - NSIMPLEMENTINTERV_GEN_ALL_ED
Implemented All Fall with Harm Risk Interventions:  [place] to call system. Call bell, personal items and telephone within reach. Instruct patient to call for assistance. Room bathroom lighting operational. Non-slip footwear when patient is off stretcher. Physically safe environment: no spills, clutter or unnecessary equipment. Stretcher in lowest position, wheels locked, appropriate side rails in place. Provide visual cue, wrist band, yellow gown, etc. Monitor gait and stability. Monitor for mental status changes and reorient to person, place, and time. Review medications for side effects contributing to fall risk. Reinforce activity limits and safety measures with patient and family. Provide visual clues: red socks.

## 2022-04-08 NOTE — ED ADULT TRIAGE NOTE - CHIEF COMPLAINT QUOTE
PT BIBEMS FROM HOME CC "SHAKING," NOT ABLE TO AMBULATE WELL, EMS STATES PT HAS BEEN SLOWING DECLINING, PHYSICALLY AND MENTALLY, PROBLEMS WITH MEMORY, AND WALKING.

## 2022-04-08 NOTE — ED ADULT NURSE REASSESSMENT NOTE - NS ED NURSE REASSESS COMMENT FT1
Attempted to walk pt with walker, pt able to stand up, but standing with very bent back and is not able to take any steps forward. Dr Santiago aware.

## 2022-04-08 NOTE — ED PROVIDER NOTE - MUSCULOSKELETAL, MLM
Bilateral lower extremity 2-3+ pitting edema, nontender. BECKMAN x4. No focal extremity tenderness. +Mild left knee effusion; nontender; no warmth or erythema. Bilateral SLR 35 degrees with pain. Normal motor.

## 2022-04-08 NOTE — ED ADULT NURSE REASSESSMENT NOTE - NS ED NURSE REASSESS COMMENT FT1
Assumed care of Pt from PRINCE Hassan. Pt received resting comfortably in stretcher. Provided Pt with dinner. Pt requesting additional blankets, complaining of being cold. Vital signs stable at this time. Per prior RN, Pt's blood pressure has been running high. Per Pt, she takes Spironolactone 25 mg and Amlodipine Beyselate 2.5 mg daily, but did not take her medications this morning. Will review with MD. Assumed care of Pt from PRINCE Hassan. Pt received resting comfortably in stretcher. Provided Pt with dinner. Pt requesting additional blankets, complaining of being cold. Vital signs stable at this time. Per prior RN, Pt's blood pressure has been running high. Per Pt, she takes Spironolactone 25 mg and Amlodipine Beyselate 2.5 mg daily, but did not take her medications this morning. Will review with MD. Patient safety maintained. No additional requests or complaints at this time. Will continue to monitor.

## 2022-04-08 NOTE — H&P ADULT - NSICDXPASTMEDICALHX_GEN_ALL_CORE_FT
PAST MEDICAL HISTORY:  History of left bundle branch block (LBBB)     HTN (hypertension)     SSS (sick sinus syndrome)     Third degree heart block

## 2022-04-08 NOTE — H&P ADULT - NSICDXFAMILYHX_GEN_ALL_CORE_FT
FAMILY HISTORY:  Mother  Still living? No  Family history of renal failure, Age at diagnosis: 61-70

## 2022-04-08 NOTE — ED PROVIDER NOTE - NEUROLOGICAL, MLM
Alert and oriented x3, no focal deficits, no motor or sensory deficits. Normal speech. Cranial nerves 2-12 intact.

## 2022-04-08 NOTE — ED PROVIDER NOTE - ENMT, MLM
Airway patent, Nasal mucosa clear. Mouth with mildly dry mucosa. Throat has no vesicles, no oropharyngeal exudates and uvula is midline. Oropharynx clear.

## 2022-04-08 NOTE — ED ADULT NURSE REASSESSMENT NOTE - NS ED NURSE REASSESS COMMENT FT1
Patient is unstable on her feet uses a walker.  Increasing weakness and difficulty walking over past 8 days with pain below her right knee.  Patient has edema to lower extremities with mild redness bilaterally.  Patient has an aid that assists her 3x a week for 2 hours.  Patient is alert and oriented to person, place and date with mild forgetfulness.

## 2022-04-08 NOTE — ED PROVIDER NOTE - CONSTITUTIONAL, MLM
normal... Elderly white female, not acutely ill, well appearing, awake, alert, oriented to person, place, time/situation and in no apparent distress.

## 2022-04-09 DIAGNOSIS — R53.1 WEAKNESS: ICD-10-CM

## 2022-04-09 LAB
ALBUMIN SERPL ELPH-MCNC: 2.8 G/DL — LOW (ref 3.3–5)
ALP SERPL-CCNC: 69 U/L — SIGNIFICANT CHANGE UP (ref 40–120)
ALT FLD-CCNC: 25 U/L — SIGNIFICANT CHANGE UP (ref 12–78)
ANION GAP SERPL CALC-SCNC: 5 MMOL/L — SIGNIFICANT CHANGE UP (ref 5–17)
AST SERPL-CCNC: 29 U/L — SIGNIFICANT CHANGE UP (ref 15–37)
BILIRUB SERPL-MCNC: 0.7 MG/DL — SIGNIFICANT CHANGE UP (ref 0.2–1.2)
BUN SERPL-MCNC: 15 MG/DL — SIGNIFICANT CHANGE UP (ref 7–23)
CALCIUM SERPL-MCNC: 8.7 MG/DL — SIGNIFICANT CHANGE UP (ref 8.5–10.1)
CHLORIDE SERPL-SCNC: 106 MMOL/L — SIGNIFICANT CHANGE UP (ref 96–108)
CO2 SERPL-SCNC: 29 MMOL/L — SIGNIFICANT CHANGE UP (ref 22–31)
CREAT SERPL-MCNC: 0.52 MG/DL — SIGNIFICANT CHANGE UP (ref 0.5–1.3)
CULTURE RESULTS: SIGNIFICANT CHANGE UP
EGFR: 88 ML/MIN/1.73M2 — SIGNIFICANT CHANGE UP
GLUCOSE SERPL-MCNC: 160 MG/DL — HIGH (ref 70–99)
HCT VFR BLD CALC: 45.1 % — HIGH (ref 34.5–45)
HGB BLD-MCNC: 15.2 G/DL — SIGNIFICANT CHANGE UP (ref 11.5–15.5)
MCHC RBC-ENTMCNC: 32 PG — SIGNIFICANT CHANGE UP (ref 27–34)
MCHC RBC-ENTMCNC: 33.7 GM/DL — SIGNIFICANT CHANGE UP (ref 32–36)
MCV RBC AUTO: 94.9 FL — SIGNIFICANT CHANGE UP (ref 80–100)
PLATELET # BLD AUTO: 188 K/UL — SIGNIFICANT CHANGE UP (ref 150–400)
POTASSIUM SERPL-MCNC: 3.7 MMOL/L — SIGNIFICANT CHANGE UP (ref 3.5–5.3)
POTASSIUM SERPL-SCNC: 3.7 MMOL/L — SIGNIFICANT CHANGE UP (ref 3.5–5.3)
PROT SERPL-MCNC: 5.9 GM/DL — LOW (ref 6–8.3)
RBC # BLD: 4.75 M/UL — SIGNIFICANT CHANGE UP (ref 3.8–5.2)
RBC # FLD: 13.9 % — SIGNIFICANT CHANGE UP (ref 10.3–14.5)
SODIUM SERPL-SCNC: 140 MMOL/L — SIGNIFICANT CHANGE UP (ref 135–145)
SPECIMEN SOURCE: SIGNIFICANT CHANGE UP
WBC # BLD: 8.43 K/UL — SIGNIFICANT CHANGE UP (ref 3.8–10.5)
WBC # FLD AUTO: 8.43 K/UL — SIGNIFICANT CHANGE UP (ref 3.8–10.5)

## 2022-04-09 PROCEDURE — 97116 GAIT TRAINING THERAPY: CPT | Mod: GP

## 2022-04-09 PROCEDURE — 87635 SARS-COV-2 COVID-19 AMP PRB: CPT

## 2022-04-09 PROCEDURE — 80048 BASIC METABOLIC PNL TOTAL CA: CPT

## 2022-04-09 PROCEDURE — 36415 COLL VENOUS BLD VENIPUNCTURE: CPT

## 2022-04-09 PROCEDURE — 93971 EXTREMITY STUDY: CPT | Mod: 26,LT

## 2022-04-09 PROCEDURE — 93306 TTE W/DOPPLER COMPLETE: CPT | Mod: 26

## 2022-04-09 PROCEDURE — 97162 PT EVAL MOD COMPLEX 30 MIN: CPT | Mod: GP

## 2022-04-09 PROCEDURE — U0005: CPT

## 2022-04-09 PROCEDURE — 85027 COMPLETE CBC AUTOMATED: CPT

## 2022-04-09 PROCEDURE — 83036 HEMOGLOBIN GLYCOSYLATED A1C: CPT

## 2022-04-09 PROCEDURE — U0003: CPT

## 2022-04-09 PROCEDURE — 97530 THERAPEUTIC ACTIVITIES: CPT | Mod: GP

## 2022-04-09 PROCEDURE — 85025 COMPLETE CBC W/AUTO DIFF WBC: CPT

## 2022-04-09 RX ADMIN — ATENOLOL 50 MILLIGRAM(S): 25 TABLET ORAL at 11:43

## 2022-04-09 RX ADMIN — AMLODIPINE BESYLATE 5 MILLIGRAM(S): 2.5 TABLET ORAL at 11:48

## 2022-04-09 NOTE — PROGRESS NOTE ADULT - SUBJECTIVE AND OBJECTIVE BOX
HPI: 89F with PMHx LBBB, HTN, SSS, 3rd degree heart block s/p PPM presents for generalized weakness and worsening ambulating x3 weeks. Pt states aide rang her doorbell today but was too weak to get to the door. Aide eventually opened the door herself and found pt to be looking pale and shaking all over. Pt denies LOC and remembers the whole event. Pt states her legs have gotten progressively to the point she feels like she is shuffling her feet with her walker. Pt states she has associated chills, hip stiffness and left shoulder stiffness. Pt also reports she was recently started on a diuretic a week ago for swelling in her legs that has been going on for months but also have gotten progressively worse over the past few weeks. Denies CP, SOB, orthopnea, cough, N/V/D, fevers, dysuria.     In the ED, vitals T97.4F, HR60, /53, RR18, satting 100% on RA.    Subjective: No sxs reported since the ED. Pt remains slightly weak but is feeling improved.     Gen: no fevers, +chills, no sweats, no weight loss, +fatigue  Cardiovascular: no chest pain, no palpitations, no orthopnea, +leg swelling  Respiratory: no shortness of breath, no exertional dyspnea, no cough, no rhinorrhea, no nasal congestion  GI: no difficulty swallowing, no nausea, no vomiting, no abdominal pain, no diarrhea, no constipation, no melana  : no dysuria, no increased freq, no hematuria, no malodorous urine  Derm: no wounds, no rashes  Heme: no easy bleeding or bruising  MSK: no joint pain, no joint swelling or redness, no extremity pain, +hip stiffness, +left shoulder stiffness  Neuro: no numbness, +weakness, no memory loss  Psych: no depression, no anxiety, no SI    MEDICATIONS:  MEDICATIONS  (STANDING):  amLODIPine   Tablet 5 milliGRAM(s) Oral daily  ATENolol  Tablet 50 milliGRAM(s) Oral daily    MEDICATIONS  (PRN):      LABS: All Labs Reviewed:                        14.8   8.83  )-----------( 191      ( 08 Apr 2022 14:39 )             44.0     04-08    138  |  102  |  19  ----------------------------<  122<H>  4.3   |  29  |  0.64    Ca    9.3      08 Apr 2022 14:39            Blood Culture:   I&O's Summary    08 Apr 2022 07:01  -  09 Apr 2022 07:00  --------------------------------------------------------  IN: 0 mL / OUT: 400 mL / NET: -400 mL      CAPILLARY BLOOD GLUCOSE      POCT Blood Glucose.: 130 mg/dL (08 Apr 2022 13:31)    EKG/RADIOLOGY  < from: CT Head No Cont (04.08.22 @ 14:20) >  INTERPRETATION:  Clinical Indication: Weakness    5mm axial sections of the brain were obtained from base to vertex,   without the intravenous administration of contrast material. Coronal and   sagittal computer generated reconstructed views are available.    Comparison is made with the prior CT of 6/24/2017 and demonstrates no   significant interval change.    Ventricles and sulci are prominent consistent with moderate atrophy. No   acute infarcts or hemorrhage. No change since the prior exam. Bone window   examination is unremarkable.        IMPRESSION: Moderate atrophy. No hemorrhage. No change since 6/24/2017.    < end of copied text >

## 2022-04-09 NOTE — PROGRESS NOTE ADULT - ASSESSMENT
89F with PMHx as above presents for generalized weakness.    #Generalized Weakness 2/2 aldactone use?  -In the ED, T97.4F, HR60, /53, RR18, WBC 8.83  -UA unremarkable  -CXR awaiting final read though no evidence of consolidations on personal review  -CT Head shows moderate atrophy. No evidence of hemorrhage. No change since 6/2017  -TSH WNL  -DANIELLE pending  -Trops 38.38  -Likely 2/2 recent aldactone use  -PT eval    #Bilateral LE edema  -Likely 2/2 venous congestion  - F/u TTE  - F/u albumin    #Left Knee Effusion  -f/u L Knee XR  -f/u L Knee US  -Likely causing pt's ataxia    #Bilateral Hip stiffness  -Likely 2/2 OA  -F/U b/l hip XR    #HTN  -Cont atenolol 50mg PO QD  -Cont Norvasc 2.5mg PO QD    #DVT PPX  -Ambulation    #DNR/DNI - prior MOLST    Case discussed with Dr Rivera 89F with PMHx as above presents for generalized weakness.    #Generalized Weakness 2/2 aldactone use vs elderly debility (resolved)  -Currently no complaints of weakness  -In the ED, T97.4F, HR60, /53, RR18, WBC 8.83  -UA unremarkable  -CXR awaiting final read though no evidence of consolidations on personal review  -CT Head shows moderate atrophy. No evidence of hemorrhage. No change since 6/2017  -TSH, Creatine Kinase, ESR WNL  -DANIELLE pending  -Trops 38.38  -PT eval pending    #Difficulty walking/Ataxia  #Left Knee Effusion  -f/u L Knee XR  -f/u L Knee US  -Likely causing pt's ataxia  -PT eval pending    #Bilateral LE edema  -Likely 2/2 venous congestion  -Albumin 2.8  -F/u TTE      #Bilateral Hip stiffness  -Likely 2/2 OA  -F/U b/l hip XR    #HTN  -Cont atenolol 50mg PO QD  -Cont Norvasc 2.5mg PO QD    #DVT PPX  -Ambulation    #DNR/DNI - prior MOLST    Case discussed with Dr Rivera

## 2022-04-10 LAB
ANION GAP SERPL CALC-SCNC: 5 MMOL/L — SIGNIFICANT CHANGE UP (ref 5–17)
BUN SERPL-MCNC: 14 MG/DL — SIGNIFICANT CHANGE UP (ref 7–23)
CALCIUM SERPL-MCNC: 8.5 MG/DL — SIGNIFICANT CHANGE UP (ref 8.5–10.1)
CHLORIDE SERPL-SCNC: 105 MMOL/L — SIGNIFICANT CHANGE UP (ref 96–108)
CO2 SERPL-SCNC: 29 MMOL/L — SIGNIFICANT CHANGE UP (ref 22–31)
CREAT SERPL-MCNC: 0.62 MG/DL — SIGNIFICANT CHANGE UP (ref 0.5–1.3)
EGFR: 85 ML/MIN/1.73M2 — SIGNIFICANT CHANGE UP
GLUCOSE SERPL-MCNC: 168 MG/DL — HIGH (ref 70–99)
HCT VFR BLD CALC: 47.9 % — HIGH (ref 34.5–45)
HGB BLD-MCNC: 16 G/DL — HIGH (ref 11.5–15.5)
MCHC RBC-ENTMCNC: 31.9 PG — SIGNIFICANT CHANGE UP (ref 27–34)
MCHC RBC-ENTMCNC: 33.4 GM/DL — SIGNIFICANT CHANGE UP (ref 32–36)
MCV RBC AUTO: 95.6 FL — SIGNIFICANT CHANGE UP (ref 80–100)
PLATELET # BLD AUTO: 225 K/UL — SIGNIFICANT CHANGE UP (ref 150–400)
POTASSIUM SERPL-MCNC: 4 MMOL/L — SIGNIFICANT CHANGE UP (ref 3.5–5.3)
POTASSIUM SERPL-SCNC: 4 MMOL/L — SIGNIFICANT CHANGE UP (ref 3.5–5.3)
RBC # BLD: 5.01 M/UL — SIGNIFICANT CHANGE UP (ref 3.8–5.2)
RBC # FLD: 14.1 % — SIGNIFICANT CHANGE UP (ref 10.3–14.5)
SODIUM SERPL-SCNC: 139 MMOL/L — SIGNIFICANT CHANGE UP (ref 135–145)
WBC # BLD: 8.68 K/UL — SIGNIFICANT CHANGE UP (ref 3.8–10.5)
WBC # FLD AUTO: 8.68 K/UL — SIGNIFICANT CHANGE UP (ref 3.8–10.5)

## 2022-04-10 PROCEDURE — 99232 SBSQ HOSP IP/OBS MODERATE 35: CPT | Mod: GC

## 2022-04-10 RX ORDER — ONDANSETRON 8 MG/1
4 TABLET, FILM COATED ORAL EVERY 8 HOURS
Refills: 0 | Status: DISCONTINUED | OUTPATIENT
Start: 2022-04-10 | End: 2022-04-15

## 2022-04-10 RX ORDER — ACETAMINOPHEN 500 MG
650 TABLET ORAL EVERY 6 HOURS
Refills: 0 | Status: DISCONTINUED | OUTPATIENT
Start: 2022-04-10 | End: 2022-04-15

## 2022-04-10 RX ORDER — LANOLIN ALCOHOL/MO/W.PET/CERES
3 CREAM (GRAM) TOPICAL AT BEDTIME
Refills: 0 | Status: DISCONTINUED | OUTPATIENT
Start: 2022-04-10 | End: 2022-04-15

## 2022-04-10 RX ORDER — ENOXAPARIN SODIUM 100 MG/ML
40 INJECTION SUBCUTANEOUS EVERY 24 HOURS
Refills: 0 | Status: DISCONTINUED | OUTPATIENT
Start: 2022-04-10 | End: 2022-04-15

## 2022-04-10 RX ADMIN — ENOXAPARIN SODIUM 40 MILLIGRAM(S): 100 INJECTION SUBCUTANEOUS at 13:08

## 2022-04-10 RX ADMIN — AMLODIPINE BESYLATE 5 MILLIGRAM(S): 2.5 TABLET ORAL at 10:07

## 2022-04-10 RX ADMIN — ATENOLOL 50 MILLIGRAM(S): 25 TABLET ORAL at 10:07

## 2022-04-10 NOTE — PROGRESS NOTE ADULT - SUBJECTIVE AND OBJECTIVE BOX
Pt has been seen and examined with FP resident, resident supervised agree with a/p       Patient is a 89y old  Female who presents with a chief complaint of generalized weakness (09 Apr 2022 08:44)      HPI:  89F with PMHx LBBB, HTN, SSS, 3rd degree heart block s/p PPM presents for generalized weakness and worsening ambulating x3 weeks.     PHYSICAL EXAM:  Vital Signs Last 24 Hrs  T(C): 36.4 (10 Apr 2022 07:43), Max: 36.6 (09 Apr 2022 15:46)  T(F): 97.6 (10 Apr 2022 07:43), Max: 97.8 (09 Apr 2022 15:46)  HR: 60 (10 Apr 2022 10:04) (59 - 60)  BP: 116/87 (10 Apr 2022 10:04) (108/47 - 133/62)  BP(mean): --  RR: 18 (10 Apr 2022 07:43) (18 - 18)  SpO2: 96% (10 Apr 2022 07:43) (93% - 96%)  -rs-aeeb, cta  -cvs-s1s2 normal   -p/a-soft,bs+      A/P    #needs placement     #ct supportive care    #Elevated blood sugar noted- considering her age- appropriate blood sugar considering pleasure of food at this age.     #discharge plan for tomorrow

## 2022-04-10 NOTE — PROGRESS NOTE ADULT - ASSESSMENT
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 Pt admitted due to:     #Generalized Weakness 2/2 elderly debility   - Pt also was in Aldactone before but w/o electrolytes disturbance since admission    -Currently no complaints of weakness  -UA unremarkable  -CXR awaiting final read though no evidence of consolidations on personal review  -CT Head shows moderate atrophy. No evidence of hemorrhage. No change since 6/2017  -TSH, Creatine Kinase, ESR WNL  -DANIELLE pending  -Trops 38.38  -Pending PT evaluation and placement     #Difficulty walking  #Left Knee Effusion  -f/u L Knee XR  - L Knee US showed left knee effusion and baker cyst   - No back pain reported  - No neurological deficit   -PT eval pending    #Bilateral LE edema  -Improving   -Likely 2/2 venous insufficiency   -Albumin 2.8  - TTE showed LVEF 55%    #Bilateral Hip stiffness  -Likely 2/2 OA  -F/U b/l hip XR    #HTN  -Cont atenolol 50mg PO QD  -Cont Norvasc 2.5mg PO QD    #DVT PPX  - Lovenox     #DNR/DNI - prior MOLST    Dispo: PT evaluation and discharge planning     Case discussed with Dr Rivera

## 2022-04-10 NOTE — PROGRESS NOTE ADULT - SUBJECTIVE AND OBJECTIVE BOX
HPI:  89F with PMHx LBBB, HTN, SSS, 3rd degree heart block s/p PPM presents for generalized weakness and worsening ambulating x3 weeks. Pt states aide rang her doorbell today but was too weak to get to the door. Aide eventually opened the door herself and found pt to be looking pale and shaking all over. Pt denies LOC and remembers the whole event. Pt states her legs have gotten progressively to the point she feels like she is shuffling her feet with her walker. Pt states she has associated chills, hip stiffness and left shoulder stiffness. Pt also reports she was recently started on a diuretic a week ago for swelling in her legs that has been going on for months but also have gotten progressively worse over the past few weeks. Denies CP, SOB, orthopnea, cough, N/V/D, fevers, dysuria.     SUBJECTIVE  Pt is evaluated at bedside and found NAD. Pt reports have not been able walk since is hospitalized. She endorse R leg lateral malleolar on/off pain that she thinks could be 2/2 arthritis. She endorse had an episode of shakiness an inability to walks previously to came to the hospital but denies any new episode. She denies back pain, leg numbness or tingling, SOB chest pain and any other symptoms.     MEDICATIONS  (STANDING):  amLODIPine   Tablet 5 milliGRAM(s) Oral daily  ATENolol  Tablet 50 milliGRAM(s) Oral daily  enoxaparin Injectable 40 milliGRAM(s) SubCutaneous every 24 hours    MEDICATIONS  (PRN):  acetaminophen     Tablet .. 650 milliGRAM(s) Oral every 6 hours PRN Temp greater or equal to 38C (100.4F), Mild Pain (1 - 3)  aluminum hydroxide/magnesium hydroxide/simethicone Suspension 30 milliLiter(s) Oral every 4 hours PRN Dyspepsia  melatonin 3 milliGRAM(s) Oral at bedtime PRN Insomnia  ondansetron Injectable 4 milliGRAM(s) IV Push every 8 hours PRN Nausea and/or Vomiting      Vital Signs Last 24 Hrs  T(C): 36.4 (10 Apr 2022 15:10), Max: 36.4 (09 Apr 2022 23:32)  T(F): 97.5 (10 Apr 2022 15:10), Max: 97.6 (10 Apr 2022 07:43)  HR: 59 (10 Apr 2022 15:30) (59 - 60)  BP: 115/44 (10 Apr 2022 15:30) (115/44 - 133/62)  BP(mean): --  RR: 18 (10 Apr 2022 15:10) (18 - 18)  SpO2: 98% (10 Apr 2022 15:10) (96% - 98%)    Physical Exam   Gen: NAD, comfortable  HENT: atraumatic head and ears, no gross abnormalities of ears, mucous membranes moist, no oral lesions, neck supple without masses/goiter/lymphadenopathy  CV: RRR, nl s1/s2, no M/R/G  Pulm: nl respiratory effort, CTAB, no wheezes/crackles/rhonchi  Back: no scoliosis, lordosis, or kyphosis, no tenderness  Abd: normoactive bowel sounds in all 4 quadrants, soft, nontender, nondistended, no rebound, no guarding, no masses  Extremities: no pedal edema, pedal pulses palpable.   Skin: bilateral leg erythematous and scaly plaques consistent with stasis dermatitis   Neuro: A&Ox3, answering questions appropriately, PERRL, EOMI, face symmetric, strength 4/5 lower extremities       LABS:                          16.0   8.68  )-----------( 225      ( 10 Apr 2022 09:58 )             47.9     10 Apr 2022 09:58    139    |  105    |  14     ----------------------------<  168    4.0     |  29     |  0.62     Ca    8.5        10 Apr 2022 09:58    TPro  5.9    /  Alb  2.8    /  TBili  0.7    /  DBili  x      /  AST  29     /  ALT  25     /  AlkPhos  69     09 Apr 2022 09:08    LIVER FUNCTIONS - ( 09 Apr 2022 09:08 )  Alb: 2.8 g/dL / Pro: 5.9 gm/dL / ALK PHOS: 69 U/L / ALT: 25 U/L / AST: 29 U/L / GGT: x             CAPILLARY BLOOD GLUCOSE        CARDIAC MARKERS ( 09 Apr 2022 09:08 )  x     / x     / 175 U/L / x     / x        RADIOLOGY:     < from: CT Head No Cont (04.08.22 @ 14:20) >  INTERPRETATION:  Clinical Indication: Weakness    5mm axial sections of the brain were obtained from base to vertex,   without the intravenous administration of contrast material. Coronal and   sagittal computer generated reconstructed views are available.    Comparison is made with the prior CT of 6/24/2017 and demonstrates no   significant interval change.    Ventricles and sulci are prominent consistent with moderate atrophy. No   acute infarcts or hemorrhage. No change since the prior exam. Bone window   examination is unremarkable.    IMPRESSION: Moderate atrophy. No hemorrhage. No change since 6/24/2017.

## 2022-04-11 LAB
A1C WITH ESTIMATED AVERAGE GLUCOSE RESULT: 6.4 % — HIGH (ref 4–5.6)
ANION GAP SERPL CALC-SCNC: 6 MMOL/L — SIGNIFICANT CHANGE UP (ref 5–17)
BASOPHILS # BLD AUTO: 0.05 K/UL — SIGNIFICANT CHANGE UP (ref 0–0.2)
BASOPHILS NFR BLD AUTO: 0.6 % — SIGNIFICANT CHANGE UP (ref 0–2)
BUN SERPL-MCNC: 13 MG/DL — SIGNIFICANT CHANGE UP (ref 7–23)
CALCIUM SERPL-MCNC: 8.8 MG/DL — SIGNIFICANT CHANGE UP (ref 8.5–10.1)
CHLORIDE SERPL-SCNC: 107 MMOL/L — SIGNIFICANT CHANGE UP (ref 96–108)
CO2 SERPL-SCNC: 26 MMOL/L — SIGNIFICANT CHANGE UP (ref 22–31)
CREAT SERPL-MCNC: 0.59 MG/DL — SIGNIFICANT CHANGE UP (ref 0.5–1.3)
EGFR: 86 ML/MIN/1.73M2 — SIGNIFICANT CHANGE UP
EOSINOPHIL # BLD AUTO: 0.13 K/UL — SIGNIFICANT CHANGE UP (ref 0–0.5)
EOSINOPHIL NFR BLD AUTO: 1.5 % — SIGNIFICANT CHANGE UP (ref 0–6)
ESTIMATED AVERAGE GLUCOSE: 137 MG/DL — HIGH (ref 68–114)
GLUCOSE SERPL-MCNC: 176 MG/DL — HIGH (ref 70–99)
HCT VFR BLD CALC: 48.4 % — HIGH (ref 34.5–45)
HGB BLD-MCNC: 16.2 G/DL — HIGH (ref 11.5–15.5)
IMM GRANULOCYTES NFR BLD AUTO: 0.3 % — SIGNIFICANT CHANGE UP (ref 0–1.5)
LYMPHOCYTES # BLD AUTO: 1.33 K/UL — SIGNIFICANT CHANGE UP (ref 1–3.3)
LYMPHOCYTES # BLD AUTO: 15.5 % — SIGNIFICANT CHANGE UP (ref 13–44)
MCHC RBC-ENTMCNC: 31.8 PG — SIGNIFICANT CHANGE UP (ref 27–34)
MCHC RBC-ENTMCNC: 33.5 GM/DL — SIGNIFICANT CHANGE UP (ref 32–36)
MCV RBC AUTO: 94.9 FL — SIGNIFICANT CHANGE UP (ref 80–100)
MONOCYTES # BLD AUTO: 0.65 K/UL — SIGNIFICANT CHANGE UP (ref 0–0.9)
MONOCYTES NFR BLD AUTO: 7.6 % — SIGNIFICANT CHANGE UP (ref 2–14)
NEUTROPHILS # BLD AUTO: 6.39 K/UL — SIGNIFICANT CHANGE UP (ref 1.8–7.4)
NEUTROPHILS NFR BLD AUTO: 74.5 % — SIGNIFICANT CHANGE UP (ref 43–77)
PLATELET # BLD AUTO: 205 K/UL — SIGNIFICANT CHANGE UP (ref 150–400)
POTASSIUM SERPL-MCNC: 4.2 MMOL/L — SIGNIFICANT CHANGE UP (ref 3.5–5.3)
POTASSIUM SERPL-SCNC: 4.2 MMOL/L — SIGNIFICANT CHANGE UP (ref 3.5–5.3)
RBC # BLD: 5.1 M/UL — SIGNIFICANT CHANGE UP (ref 3.8–5.2)
RBC # FLD: 14 % — SIGNIFICANT CHANGE UP (ref 10.3–14.5)
SODIUM SERPL-SCNC: 139 MMOL/L — SIGNIFICANT CHANGE UP (ref 135–145)
WBC # BLD: 8.58 K/UL — SIGNIFICANT CHANGE UP (ref 3.8–10.5)
WBC # FLD AUTO: 8.58 K/UL — SIGNIFICANT CHANGE UP (ref 3.8–10.5)

## 2022-04-11 PROCEDURE — 99233 SBSQ HOSP IP/OBS HIGH 50: CPT | Mod: GC

## 2022-04-11 RX ADMIN — ENOXAPARIN SODIUM 40 MILLIGRAM(S): 100 INJECTION SUBCUTANEOUS at 13:31

## 2022-04-11 RX ADMIN — AMLODIPINE BESYLATE 5 MILLIGRAM(S): 2.5 TABLET ORAL at 10:21

## 2022-04-11 RX ADMIN — ATENOLOL 50 MILLIGRAM(S): 25 TABLET ORAL at 10:21

## 2022-04-11 NOTE — PHYSICAL THERAPY INITIAL EVALUATION ADULT - PRECAUTIONS/LIMITATIONS, REHAB EVAL
skin precautions due to prominent kyphosis  mid-back with pressure changes noted over SPs after resting supine in the bed since admission/fall precautions

## 2022-04-11 NOTE — PROGRESS NOTE ADULT - ATTENDING COMMENTS
-rs-aeeb, cta  -p/a-soft, bs+  -cvs-s1s2 normal     A/P    # PT and discharge plan pleasant elderly woman p/w weakness likely multifactorial.  her OA and volume depletion contributed.  will get OOB, check orthostatic vs's, ambulate with walker, assess stability for home environment, APAP prn.

## 2022-04-11 NOTE — PHYSICAL THERAPY INITIAL EVALUATION ADULT - ACTIVE RANGE OF MOTION EXAMINATION, REHAB EVAL
some difficulty/stiffness with elevation L shoulder; B shoulder elevation is limited moderately by postural changes /severe kyphosis/bilateral upper extremity Active ROM was WFL (within functional limits)/Right LE Active ROM was WFL (within functional limits)/deficits as listed below

## 2022-04-11 NOTE — PHYSICAL THERAPY INITIAL EVALUATION ADULT - DISCHARGE DISPOSITION, PT EVAL
pt & camille at bedside acknowledge need for rehab on DC and are discussing potential rehab locations ; d/w Teresa Potter CM/rehabilitation facility

## 2022-04-11 NOTE — PHYSICAL THERAPY INITIAL EVALUATION ADULT - MARITAL STATUS
nearest relative is a daughter ; camille visited today from [facility] and is very concerned/supportive/Single

## 2022-04-11 NOTE — PHYSICAL THERAPY INITIAL EVALUATION ADULT - PLANNED THERAPY INTERVENTIONS, PT EVAL
fall prevention/home safety, ADLs/balance training/bed mobility training/gait training/postural re-education/ROM/strengthening/stretching/transfer training

## 2022-04-11 NOTE — PHYSICAL THERAPY INITIAL EVALUATION ADULT - GAIT DEVIATIONS NOTED, PT EVAL
shuffling steps ,very slow to initiate stepping ,decreased weight shifting R to L/increased time in double stance/decreased step length/decreased stride length

## 2022-04-11 NOTE — PHYSICAL THERAPY INITIAL EVALUATION ADULT - GENERAL OBSERVATIONS, REHAB EVAL
pt resting in bed ,her posture is mod-severely hunched due to OP with +kyphosis , appears frail , ,she reports recent 11lb weight loss (down to 81lbs from 92),admits to diminished appetite

## 2022-04-11 NOTE — PHYSICAL THERAPY INITIAL EVALUATION ADULT - ADDITIONAL COMMENTS
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

## 2022-04-11 NOTE — PHYSICAL THERAPY INITIAL EVALUATION ADULT - PHYSICAL ASSIST/NONPHYSICAL ASSIST, REHAB EVAL
Evaluated by Advanced Practice Provider    [facility] Emergency Department    CHIEF COMPLAINT  Leg Pain (pt reports L knee and leg pain that started three days ago while helping move furniture, reports was evaluated yesterday and given flexeril but that pain has radiated down into the back of the L heel and is not improving, reports he also is out of his percocet that he is prescribed and does not have follow up with that provider until 7/8/21 ) and Medication Refill    HISTORY OF PRESENT ILLNESS  Sulma Lawrence is a 29 y.o. male who presents to the ED with complaints of  left knee pain. 3 days ago helping move furniture and felt a pop in the left knee. Has had torn meniscus and gout in the right knee. Left knee pain is now down into the calf and going into the heel. He was seen yesterday and reports they did nothing for him but he was given a knee immobilizer, which he does not have on. The knee immobilizer \"freaks him out\" because he can't move knee. He uses the knee immobilizer when he is laying down. He has an appointment with orthopedics on Tuesday, Dr. Kane Cramer, how ever he is seeing another orthopedist for his right knee pain. He is also out of his chronic pain medication that is prescribed for his right knee pain. The patient arrived to the ED via private car. Nursing notes reviewed. Past Medical History:   Diagnosis Date    Depression      Past Surgical History:   Procedure Laterality Date    PACEMAKER INSERTION      left chest     History reviewed. No pertinent family history.   Social History     Socioeconomic History    Marital status: Single     Spouse name: Not on file    Number of children: Not on file    Years of education: Not on file    Highest education level: Not on file   Occupational History    Not on file   Tobacco Use    Smoking status: Former Smoker     Years: 5.00    Smokeless tobacco: Never Used    Tobacco comment: vap   Vaping Use    Vaping Use: Every day    Substances: Nicotine, 3%   Substance and Sexual Activity    Alcohol use: Yes     Comment: occasionally    Drug use: Never    Sexual activity: Yes     Partners: Female   Other Topics Concern    Not on file   Social History Narrative    Not on file     Social Determinants of Health     Financial Resource Strain:     Difficulty of Paying Living Expenses:    Food Insecurity:     Worried About Running Out of Food in the Last Year:     [address] in the Last Year:    Transportation Needs:     Lack of Transportation (Medical):  Lack of Transportation (Non-Medical):    Physical Activity:     Days of Exercise per Week:     Minutes of Exercise per Session:    Stress:     Feeling of Stress :    Social Connections:     Frequency of Communication with Friends and Family:     Frequency of Social Gatherings with Friends and Family:     Attends [employer] Services:     Active Member of Clubs or Organizations:     Attends Club or Organization Meetings:     Marital Status:    Intimate Partner Violence:     Fear of Current or Ex-Partner:     Emotionally Abused:     Physically Abused:     Sexually Abused:      No current facility-administered medications for this encounter. Current Outpatient Medications   Medication Sig Dispense Refill    cyclobenzaprine (FLEXERIL) 10 MG tablet Take 1 tablet by mouth 3 times daily as needed for Muscle spasms 30 tablet 0    predniSONE (DELTASONE) 10 MG tablet Take 4 tablets by mouth daily for 4 days 16 tablet 0    ALPRAZolam (XANAX) 1 MG tablet Take 1 mg by mouth 2 times daily as needed for Sleep.       citalopram (CELEXA) 40 MG tablet Take 40 mg by mouth daily      metoprolol succinate (TOPROL XL) 25 MG extended release tablet Take 25 mg by mouth daily       No Known Allergies      REVIEW OF SYSTEMS    6 systems reviewed, pertinent positives per HPI otherwise noted to be negative    PHYSICAL EXAM  /87   Pulse 114   Temp 99.4 °F (37.4 °C) (Oral)   Resp 18   Ht 5' 10\" (1.778 m)   Wt 210 lb (95.3 kg)   SpO2 98%   BMI 30.13 kg/m²   GENERAL APPEARANCE: Well developed, well nourished. Awake and alert. Cooperative. Observed resting in bed. No acute distress. HEAD: Normocephalic. Atraumatic. EYES: Sclera is non-icteric. Conjunctiva normal.  ENT: External ears are normal. Mucous membranes are moist.   NECK: Supple. Normal ROM. Trachea mid-line. Cardiac: Tachycardic rate and rhythm. Capillary refill is brisk in bilateral lower extremities. LUNGS: Breathing is unlabored. Speaking comfortably in full sentences. Equal and symmetric chest rise. Abdomen: Non-distended. EXTREMITIES: Knee exam: left. Tenderness: tot he distal knee and along both the lateral and medial joint line. Bruising: none. Erythema: none. Edema: mild. No acute deformities noted. No crepitus to palpation. Pedal and tibial pulses palpable, capillary refill less than 3 seconds, neurologically intact. Sensation is intact. ROM is limited due to pain. There is full flexion and extension of the left ankle and foot without pain. Normal ROM to all other extremities. No gross deformities or trauma noted. Moving all other extremities equally and appropriately. SKIN: Warm and dry. Skin is intact, there are no open wounds upon exam.    NEUROLOGICAL: Alert and oriented. Sensation is intact. Sensation is intact to distal tips of the toes on the left foot. Psychiatric: Mood and affect appropriate. Speech is clear and articulate. LABS  No results found for this visit on 07/03/21. RADIOLOGY  XR KNEE LEFT (3 VIEWS)    Result Date: 7/1/2021  EXAMINATION: THREE XRAY VIEWS OF THE LEFT KNEE 7/1/2021 10:58 am COMPARISON: None. HISTORY: ORDERING SYSTEM PROVIDED HISTORY: pain TECHNOLOGIST PROVIDED HISTORY: Reason for exam:->pain Reason for Exam: heard a pop yesterday when assisting moving furniture Acuity: Acute Type of Exam: Initial FINDINGS: No evidence of acute fracture or dislocation.   No be further evaluated for his knee pain. Patient then asked me if he could get a shot of something, I ordered him a Toradol shot. Patient is hemodynamically stable and afebrile in the ED except for he is noted to be tachycardic. When I reviewed prior records as far back as April 2020 patient had a tachycardia of 110. In July 2020 heart rate was documented at 127. I feel that patient chronically has tachycardia and he does not require further work-up regarding this and can be discharged. At this point I do not feel the patient requires further work up and it is reasonable to discharge the patient. Please refer to AVS for further details of discharge instructions. Patient was given the following medications while in the ED:   Medications   predniSONE (DELTASONE) tablet 60 mg (60 mg Oral Given 7/3/21 1506)   ketorolac (TORADOL) injection 30 mg (30 mg Intravenous Given 7/3/21 1506)     A discussion was had with the patient regarding diagnosis, diagnostic testing results, treatment/ plan of care, and follow up. All questions were answered. Patient will follow up as directed for further evaluation/treatment. The patient was given strict return precautions as we discussed symptoms that would necessitate return to the ED. Patient will return to ED for new/worsening symptoms. The patient verbalized their understanding and agreement with the above plan. Patient was sent home with a prescription for below medication/s. I did [place] patient on appropriate use of these medication. Discharge Medication List as of 7/3/2021  2:56 PM      START taking these medications    Details   predniSONE (DELTASONE) 10 MG tablet Take 4 tablets by mouth daily for 4 days, Disp-16 tablet, R-0Normal             I estimate there is LOW risk for COMPARTMENT SYNDROME, DEEP VENOUS THROMBOSIS, SEPTIC ARTHRITIS, TENDON OR NEUROVASCULAR INJURY, thus I consider the discharge disposition reasonable.  Marquis Kelly and I have discussed the diagnosis and risks, and we agree with discharging home to follow-up with their primary doctor or the referral orthopedist. We also discussed returning to the Emergency Department immediately if new or worsening symptoms occur. We have discussed the symptoms which are most concerning (e.g., changing or worsening pain, numbness, weakness) that necessitate immediate return. Clincal Impression    1. Injury of left knee, subsequent encounter    2. Acute pain of left knee    3. Other chronic pain        Discharge vital signs: Blood pressure 129/87, pulse 114, temperature 99.4 °F (37.4 °C), temperature source Oral, resp. rate 18, height 5' 10\" (1.778 m), weight 210 lb (95.3 kg), SpO2 98 %. FOLLOW UP  Akira Kerr  [address] [place] [address]  [phone]    Call   As needed    Saloni Knight MD  [address] [facility] [address] [facility] [address] [phone]    Go on 7/6/2021  For further evaluation    [facility]  ED  [facility]  [address] [phone]  Go to   If symptoms worsen      DISPOSITION  Patient was discharged to home in good condition. Comment: Please note this report has been produced using speech recognition software and may contain errors related to that system including errors in grammar, punctuation, and spelling, as well as words and phrases that may be inappropriate. If there are any questions or concerns please feel free to contact the dictating provider for clarification.        ROZ Toure CNP  07/04/21 [address] ROZ Farrell CNP  07/04/21 [id number] supervision/verbal cues/nonverbal cues (demo/gestures)/1 person assist

## 2022-04-11 NOTE — PROGRESS NOTE ADULT - SUBJECTIVE AND OBJECTIVE BOX
HPI:  89F with PMHx LBBB, HTN, SSS, 3rd degree heart block s/p PPM presents for generalized weakness and worsening ambulating x3 weeks. Pt states aide rang her doorbell today but was too weak to get to the door. Aide eventually opened the door herself and found pt to be looking pale and shaking all over. Pt denies LOC and remembers the whole event. Pt states her legs have gotten progressively to the point she feels like she is shuffling her feet with her walker. Pt states she has associated chills, hip stiffness and left shoulder stiffness. Pt also reports she was recently started on a diuretic a week ago for swelling in her legs that has been going on for months but also have gotten progressively worse over the past few weeks. Denies CP, SOB, orthopnea, cough, N/V/D, fevers, dysuria.     SUBJECTIVE  No overnight events reported. Pt was evaluated in the AM, at bedside by me, noting she currently feels good and endorses no recent episodes of dizziness/light-headedness or shakiness. She would like to get OOB soon. Pt  denies back pain, leg numbness or tingling, SOB chest pain and any other symptoms. ROS as stated above    Vital Signs Last 24 Hrs  T(C): 36.4 (11 Apr 2022 07:34), Max: 36.7 (10 Apr 2022 23:10)  T(F): 97.6 (11 Apr 2022 07:34), Max: 98 (10 Apr 2022 23:10)  HR: 60 (11 Apr 2022 07:34) (59 - 60)  BP: 133/54 (11 Apr 2022 07:34) (115/44 - 133/54)  BP(mean): --  RR: 16 (11 Apr 2022 07:34) (16 - 18)  SpO2: 96% (11 Apr 2022 07:34) (96% - 98%)    Physical Exam   Gen: NAD, comfortable  HENT: atraumatic head and ears, no gross abnormalities of ears, mucous membranes moist, no oral lesions, neck supple without masses/goiter/lymphadenopathy  CV: RRR, nl s1/s2, no M/R/G  Pulm: nl respiratory effort, CTAB, no wheezes/crackles/rhonchi  Back: no scoliosis, lordosis, or kyphosis, no tenderness  Abd: normoactive bowel sounds in all 4 quadrants, soft, nontender, nondistended, no rebound, no guarding, no masses  Extremities: no pedal edema, pedal pulses palpable.   Skin: bilateral leg erythematous and scaly plaques consistent with stasis dermatitis   Neuro: A&Ox3, answering questions appropriately, PERRL, EOMI, face symmetric, strength 4/5 lower extremities   MEDICATIONS:  MEDICATIONS  (STANDING):  amLODIPine   Tablet 5 milliGRAM(s) Oral daily  ATENolol  Tablet 50 milliGRAM(s) Oral daily  enoxaparin Injectable 40 milliGRAM(s) SubCutaneous every 24 hours    MEDICATIONS  (PRN):  acetaminophen     Tablet .. 650 milliGRAM(s) Oral every 6 hours PRN Temp greater or equal to 38C (100.4F), Mild Pain (1 - 3)  aluminum hydroxide/magnesium hydroxide/simethicone Suspension 30 milliLiter(s) Oral every 4 hours PRN Dyspepsia  melatonin 3 milliGRAM(s) Oral at bedtime PRN Insomnia  ondansetron Injectable 4 milliGRAM(s) IV Push every 8 hours PRN Nausea and/or Vomiting      LABS: All Labs Reviewed:                        16.0   8.68  )-----------( 225      ( 10 Apr 2022 09:58 )             47.9     04-10    139  |  105  |  14  ----------------------------<  168<H>  4.0   |  29  |  0.62    Ca    8.5      10 Apr 2022 09:58            Blood Culture: 04-08 @ 18:07  Organism --  Gram Stain Blood -- Gram Stain --  Specimen Source Clean Catch None  Culture-Blood --      I&O's Summary    CAPILLARY BLOOD GLUCOSE        RADIOLOGY:     < from: CT Head No Cont (04.08.22 @ 14:20) >  INTERPRETATION:  Clinical Indication: Weakness    5mm axial sections of the brain were obtained from base to vertex,   without the intravenous administration of contrast material. Coronal and   sagittal computer generated reconstructed views are available.    Comparison is made with the prior CT of 6/24/2017 and demonstrates no   significant interval change.    Ventricles and sulci are prominent consistent with moderate atrophy. No   acute infarcts or hemorrhage. No change since the prior exam. Bone window   examination is unremarkable.    IMPRESSION: Moderate atrophy. No hemorrhage. No change since 6/24/2017.

## 2022-04-11 NOTE — PHYSICAL THERAPY INITIAL EVALUATION ADULT - LEVEL OF INDEPENDENCE: SIT/STAND, REHAB EVAL
feet sliding fwd, appears to be WBng less on the L vs RLE upon coming to stand (vs ?LLD) slow weight shift P-A on coming to stand leaning posteriorly mod-severely ,requires full support post to prevent a bkwd fall on arising ,heavy cueing for foot placement/increased base of stance/moderate assist (50% patients effort)/maximum assist (25% patients effort)

## 2022-04-11 NOTE — PHYSICAL THERAPY INITIAL EVALUATION ADULT - ADL SKILLS, REHAB EVAL
assist with grocery shopping,laundry,house-cleaning 3 days/week ,has had the same helper for 9 years named Ethan/needed assist

## 2022-04-11 NOTE — PHYSICAL THERAPY INITIAL EVALUATION ADULT - MUSCLE TONE ASSESSMENT, REHAB EVAL
pt is underweight and I suspect severe PC malnutrition associated with recent 11 pond weight loss ,decreased muscle mass/bulk axial & appendicular skeleton

## 2022-04-11 NOTE — PROGRESS NOTE ADULT - ASSESSMENT
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 Pt admitted due to:     #Generalized Weakness 2/2 elderly debility   - Pt also was in Aldactone before but w/o electrolytes disturbance since admission    -Currently no complaints of weakness  -UA unremarkable  -CXR awaiting final read though no evidence of consolidations on personal review  -CT Head shows moderate atrophy. No evidence of hemorrhage. No change since 6/2017  -TSH, Creatine Kinase, ESR WNL  -[facility]ELLE pending  -Trops 38.38  -Pending PT evaluation and placement     #Difficulty walking  #Left Knee Effusion  -f/u L Knee XR  - L Knee US showed left knee effusion and baker cyst   - No back pain reported  - No neurological deficit   -PT eval pending    #Bilateral LE edema  -Improving   -Likely 2/2 venous insufficiency   -Albumin 2.8  - TTE showed LVEF 55%, moderate MR, TR, mildly dilated LA, PPM visible    #Bilateral Hip stiffness  -Likely 2/2 OA  -F/U b/l hip XR    #HTN  -Cont atenolol 50mg PO QD  -Cont Norvasc 2.5mg PO QD    #DVT PPX  - Lovenox     #DNR/DNI - prior MOLST    Dispo: PT evaluation and discharge planning     Case discussed with Dr Sharpe

## 2022-04-12 LAB
ANA PAT FLD IF-IMP: SIGNIFICANT CHANGE UP
ANA TITR SER: ABNORMAL
ANION GAP SERPL CALC-SCNC: 3 MMOL/L — LOW (ref 5–17)
BUN SERPL-MCNC: 16 MG/DL — SIGNIFICANT CHANGE UP (ref 7–23)
CALCIUM SERPL-MCNC: 8.9 MG/DL — SIGNIFICANT CHANGE UP (ref 8.5–10.1)
CHLORIDE SERPL-SCNC: 107 MMOL/L — SIGNIFICANT CHANGE UP (ref 96–108)
CO2 SERPL-SCNC: 29 MMOL/L — SIGNIFICANT CHANGE UP (ref 22–31)
CREAT SERPL-MCNC: 0.56 MG/DL — SIGNIFICANT CHANGE UP (ref 0.5–1.3)
EGFR: 87 ML/MIN/1.73M2 — SIGNIFICANT CHANGE UP
GLUCOSE SERPL-MCNC: 176 MG/DL — HIGH (ref 70–99)
HCT VFR BLD CALC: 47.9 % — HIGH (ref 34.5–45)
HGB BLD-MCNC: 16 G/DL — HIGH (ref 11.5–15.5)
MCHC RBC-ENTMCNC: 31.4 PG — SIGNIFICANT CHANGE UP (ref 27–34)
MCHC RBC-ENTMCNC: 33.4 GM/DL — SIGNIFICANT CHANGE UP (ref 32–36)
MCV RBC AUTO: 93.9 FL — SIGNIFICANT CHANGE UP (ref 80–100)
PLATELET # BLD AUTO: 209 K/UL — SIGNIFICANT CHANGE UP (ref 150–400)
POTASSIUM SERPL-MCNC: 4 MMOL/L — SIGNIFICANT CHANGE UP (ref 3.5–5.3)
POTASSIUM SERPL-SCNC: 4 MMOL/L — SIGNIFICANT CHANGE UP (ref 3.5–5.3)
RBC # BLD: 5.1 M/UL — SIGNIFICANT CHANGE UP (ref 3.8–5.2)
RBC # FLD: 14.1 % — SIGNIFICANT CHANGE UP (ref 10.3–14.5)
SARS-COV-2 RNA SPEC QL NAA+PROBE: DETECTED
SODIUM SERPL-SCNC: 139 MMOL/L — SIGNIFICANT CHANGE UP (ref 135–145)
WBC # BLD: 8.48 K/UL — SIGNIFICANT CHANGE UP (ref 3.8–10.5)
WBC # FLD AUTO: 8.48 K/UL — SIGNIFICANT CHANGE UP (ref 3.8–10.5)

## 2022-04-12 PROCEDURE — 99239 HOSP IP/OBS DSCHRG MGMT >30: CPT

## 2022-04-12 RX ORDER — ATENOLOL 25 MG/1
25 TABLET ORAL DAILY
Refills: 0 | Status: DISCONTINUED | OUTPATIENT
Start: 2022-04-12 | End: 2022-04-15

## 2022-04-12 RX ADMIN — ENOXAPARIN SODIUM 40 MILLIGRAM(S): 100 INJECTION SUBCUTANEOUS at 13:48

## 2022-04-12 RX ADMIN — ATENOLOL 50 MILLIGRAM(S): 25 TABLET ORAL at 10:37

## 2022-04-12 RX ADMIN — AMLODIPINE BESYLATE 5 MILLIGRAM(S): 2.5 TABLET ORAL at 10:37

## 2022-04-12 NOTE — PROGRESS NOTE ADULT - SUBJECTIVE AND OBJECTIVE BOX
HPI:  89F with PMHx LBBB, HTN, SSS, 3rd degree heart block s/p PPM presents for generalized weakness and worsening ambulating x3 weeks. Pt states aide rang her doorbell today but was too weak to get to the door. Aide eventually opened the door herself and found pt to be looking pale and shaking all over. Pt denies LOC and remembers the whole event. Pt states her legs have gotten progressively to the point she feels like she is shuffling her feet with her walker. Pt states she has associated chills, hip stiffness and left shoulder stiffness. Pt also reports she was recently started on a diuretic a week ago for swelling in her legs that has been going on for months but also have gotten progressively worse over the past few weeks. Denies CP, SOB, orthopnea, cough, N/V/D, fevers, dysuria.     SUBJECTIVE  Pt is evaluated at bedside and found NAD. Pt reports have not been able walk since is hospitalized. She endorse R leg lateral malleolar on/off pain that she thinks could be 2/2 arthritis. She endorse had an episode of shakiness an inability to walks previously to came to the hospital but denies any new episode. She denies back pain, leg numbness or tingling, SOB chest pain and any other symptoms.     MEDICATIONS  (STANDING):  amLODIPine   Tablet 5 milliGRAM(s) Oral daily  ATENolol  Tablet 25 milliGRAM(s) Oral daily  enoxaparin Injectable 40 milliGRAM(s) SubCutaneous every 24 hours    MEDICATIONS  (PRN):  acetaminophen     Tablet .. 650 milliGRAM(s) Oral every 6 hours PRN Temp greater or equal to 38C (100.4F), Mild Pain (1 - 3)  aluminum hydroxide/magnesium hydroxide/simethicone Suspension 30 milliLiter(s) Oral every 4 hours PRN Dyspepsia  melatonin 3 milliGRAM(s) Oral at bedtime PRN Insomnia  ondansetron Injectable 4 milliGRAM(s) IV Push every 8 hours PRN Nausea and/or Vomiting      Vital Signs Last 24 Hrs  T(C): 36.6 (12 Apr 2022 15:18), Max: 36.6 (12 Apr 2022 15:18)  T(F): 97.9 (12 Apr 2022 15:18), Max: 97.9 (12 Apr 2022 15:18)  HR: 62 (12 Apr 2022 15:18) (60 - 62)  BP: 110/52 (12 Apr 2022 15:18) (109/48 - 110/52)  BP(mean): --  RR: 18 (12 Apr 2022 15:18) (17 - 18)  SpO2: 96% (12 Apr 2022 15:18) (95% - 96%)    Physical Exam   Gen: NAD, comfortable  HENT: atraumatic head and ears, no gross abnormalities of ears, mucous membranes moist, no oral lesions, neck supple without masses/goiter/lymphadenopathy  CV: RRR, nl s1/s2, no M/R/G  Pulm: nl respiratory effort, CTAB, no wheezes/crackles/rhonchi  Back: no scoliosis, lordosis, or kyphosis, no tenderness  Abd: normoactive bowel sounds in all 4 quadrants, soft, nontender, nondistended, no rebound, no guarding, no masses  Extremities: no pedal edema, pedal pulses palpable.   Skin: bilateral leg erythematous and scaly plaques consistent with stasis dermatitis   Neuro: A&Ox3, answering questions appropriately, PERRL, EOMI, face symmetric, strength 4/5 lower extremities         LABS:                          16.0   8.48  )-----------( 209      ( 12 Apr 2022 08:17 )             47.9     12 Apr 2022 08:17    139    |  107    |  16     ----------------------------<  176    4.0     |  29     |  0.56     Ca    8.9        12 Apr 2022 08:17    CAPILLARY BLOOD GLUCOSE      RADIOLOGY:    < from: CT Head No Cont (04.08.22 @ 14:20) >  INTERPRETATION:  Clinical Indication: Weakness    5mm axial sections of the brain were obtained from base to vertex,   without the intravenous administration of contrast material. Coronal and   sagittal computer generated reconstructed views are available.    Comparison is made with the prior CT of 6/24/2017 and demonstrates no   significant interval change.    Ventricles and sulci are prominent consistent with moderate atrophy. No   acute infarcts or hemorrhage. No change since the prior exam. Bone window   examination is unremarkable.    IMPRESSION: Moderate atrophy. No hemorrhage. No change since 6/24/2017.     HPI:  89F with PMHx LBBB, HTN, SSS, 3rd degree heart block s/p PPM presents for generalized weakness and worsening ambulating x3 weeks. Pt states aide rang her doorbell today but was too weak to get to the door. Aide eventually opened the door herself and found pt to be looking pale and shaking all over. Pt denies LOC and remembers the whole event. Pt states her legs have gotten progressively to the point she feels like she is shuffling her feet with her walker. Pt states she has associated chills, hip stiffness and left shoulder stiffness. Pt also reports she was recently started on a diuretic a week ago for swelling in her legs that has been going on for months but also have gotten progressively worse over the past few weeks. Denies CP, SOB, orthopnea, cough, N/V/D, fevers, dysuria.     SUBJECTIVE  Pt is evaluated at bedside and found NAD. Pt reports had difficulty walking with PT one day ago. She reports feel well and denies any acute complaints. No back pain, leg numbness or tingling, SOB chest pain reported.     MEDICATIONS  (STANDING):  amLODIPine   Tablet 5 milliGRAM(s) Oral daily  ATENolol  Tablet 25 milliGRAM(s) Oral daily  enoxaparin Injectable 40 milliGRAM(s) SubCutaneous every 24 hours    MEDICATIONS  (PRN):  acetaminophen     Tablet .. 650 milliGRAM(s) Oral every 6 hours PRN Temp greater or equal to 38C (100.4F), Mild Pain (1 - 3)  aluminum hydroxide/magnesium hydroxide/simethicone Suspension 30 milliLiter(s) Oral every 4 hours PRN Dyspepsia  melatonin 3 milliGRAM(s) Oral at bedtime PRN Insomnia  ondansetron Injectable 4 milliGRAM(s) IV Push every 8 hours PRN Nausea and/or Vomiting      Vital Signs Last 24 Hrs  T(C): 36.6 (12 Apr 2022 15:18), Max: 36.6 (12 Apr 2022 15:18)  T(F): 97.9 (12 Apr 2022 15:18), Max: 97.9 (12 Apr 2022 15:18)  HR: 62 (12 Apr 2022 15:18) (60 - 62)  BP: 110/52 (12 Apr 2022 15:18) (109/48 - 110/52)  BP(mean): --  RR: 18 (12 Apr 2022 15:18) (17 - 18)  SpO2: 96% (12 Apr 2022 15:18) (95% - 96%)    Physical Exam   Gen: NAD, comfortable  HENT: atraumatic head and ears, no gross abnormalities of ears, mucous membranes moist, no oral lesions, neck supple without masses/goiter/lymphadenopathy  CV: RRR, nl s1/s2, no M/R/G  Pulm: nl respiratory effort, CTAB, no wheezes/crackles/rhonchi  Abd: normoactive bowel sounds in all 4 quadrants, soft, nontender, nondistended, no rebound, no guarding, no masses  Extremities: no pedal edema, pedal pulses palpable. L knee with effusion, no redness.    Skin: bilateral leg erythematous and scaly plaques consistent with stasis dermatitis   Neuro: A&Ox3, answering questions appropriately, PERRL, EOMI, face symmetric, strength 4/5 lower extremities       LABS:                          16.0   8.48  )-----------( 209      ( 12 Apr 2022 08:17 )             47.9     12 Apr 2022 08:17    139    |  107    |  16     ----------------------------<  176    4.0     |  29     |  0.56     Ca    8.9        12 Apr 2022 08:17    CAPILLARY BLOOD GLUCOSE      RADIOLOGY:    < from: CT Head No Cont (04.08.22 @ 14:20) >  INTERPRETATION:  Clinical Indication: Weakness    5mm axial sections of the brain were obtained from base to vertex,   without the intravenous administration of contrast material. Coronal and   sagittal computer generated reconstructed views are available.    Comparison is made with the prior CT of 6/24/2017 and demonstrates no   significant interval change.    Ventricles and sulci are prominent consistent with moderate atrophy. No   acute infarcts or hemorrhage. No change since the prior exam. Bone window   examination is unremarkable.    IMPRESSION: Moderate atrophy. No hemorrhage. No change since 6/24/2017.

## 2022-04-12 NOTE — PROGRESS NOTE ADULT - ATTENDING COMMENTS
no change in stiffness of knee but no pain while at rest    kyphotic  sm at base    imp- will decrease her BP meds- it does not make sense that BP junaid during orthostatic measurements and her resting BP is low enough that it may have contributed to her presentation. if BP rises in follow up we can resume current dosing.

## 2022-04-12 NOTE — PROGRESS NOTE ADULT - ASSESSMENT
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 Pt admitted due to:     #Generalized Weakness 2/2 elderly debility   - Pt also was in Aldactone before but w/o electrolytes disturbance since admission    -Currently no complaints of weakness  - UA negative   -CXR at admission showed no evidence of consolidations on personal review  -CT Head at admission showed moderate atrophy. No evidence of hemorrhage. No change since 6/2017  -TSH, Creatine Kinase, ESR WNL  -Trops 38.38  -PT recommended [facility]. Waiting for placement  - f/u COVID 19 test     #Difficulty walking  #Left Knee Effusion  - Could be 2/2 to OA   -L Knee XR showed large supratellar effusion and narrowing of the medial compartment   - L Knee US showed left knee effusion and baker cyst   - No back pain reported  - No neurological deficit     #Bilateral LE edema  -Improving   -Likely 2/2 venous insufficiency   -Albumin 2.8  -TTE showed LVEF 55%    #olycythemia   - Hg 16, Hct 47.9   -    #HTN  - BP in the lower side   -Orthostatic negative   -Decrease Atenolol to 25 mg PO daily   -Cont Norvasc 2.5mg PO QD    #Pre DM   - A1c 6.4%   - Monitor BG      #DVT PPX  - Lovenox     #DNR/DNI - prior MOLST    Dispo: Waiting     Case discussed with Dr Rivera       89F with PMHx LBBB, HTN, SSS, 3rd degree heart block s/p PPM presents for generalized weakness and worsening ambulating x3 weeks. Pt admitted due to:     #Generalized Weakness 2/2 elderly debility   - Pt also was in Aldactone before but w/o electrolytes disturbance since admission    -Currently no complaints of weakness  - UA negative   -CXR at admission showed no evidence of consolidations on personal review  -CT Head at admission showed moderate atrophy. No evidence of hemorrhage. No change since 6/2017  -TSH, Creatine Kinase, ESR WNL  -Trops 38.38  -PT recommended [facility]. Waiting for placement  - f/u COVID 19 test     #Difficulty walking  #Left Knee Effusion  - Could be 2/2 to OA   -L Knee XR showed large supratellar effusion and narrowing of the medial compartment   - L Knee US showed left knee effusion and baker cyst   - No back pain reported  - No neurological deficit     #Bilateral LE edema  -Improving   -Likely 2/2 venous insufficiency   -Albumin 2.8  -TTE showed LVEF 55%    #Polycythemia   - Mild   - Hg 16, Hct 47.9   - Consider outpatient workup     #HTN  - BP in the lower side   -Orthostatic negative   -Decrease Atenolol to 25 mg PO daily   -Cont Norvasc 2.5mg PO QD    #Pre DM   - A1c 6.4%   - Monitor BG      #DVT PPX  - Lovenox     #DNR/DNI - prior MOLST    Dispo: Waiting placement to [facility]      Case discussed with Dr Sharpe

## 2022-04-13 LAB
ANION GAP SERPL CALC-SCNC: 4 MMOL/L — LOW (ref 5–17)
BUN SERPL-MCNC: 13 MG/DL — SIGNIFICANT CHANGE UP (ref 7–23)
CALCIUM SERPL-MCNC: 8.7 MG/DL — SIGNIFICANT CHANGE UP (ref 8.5–10.1)
CHLORIDE SERPL-SCNC: 106 MMOL/L — SIGNIFICANT CHANGE UP (ref 96–108)
CO2 SERPL-SCNC: 30 MMOL/L — SIGNIFICANT CHANGE UP (ref 22–31)
CREAT SERPL-MCNC: 0.5 MG/DL — SIGNIFICANT CHANGE UP (ref 0.5–1.3)
EGFR: 89 ML/MIN/1.73M2 — SIGNIFICANT CHANGE UP
GLUCOSE SERPL-MCNC: 106 MG/DL — HIGH (ref 70–99)
HCT VFR BLD CALC: 45.2 % — HIGH (ref 34.5–45)
HGB BLD-MCNC: 14.9 G/DL — SIGNIFICANT CHANGE UP (ref 11.5–15.5)
MCHC RBC-ENTMCNC: 32 PG — SIGNIFICANT CHANGE UP (ref 27–34)
MCHC RBC-ENTMCNC: 33 GM/DL — SIGNIFICANT CHANGE UP (ref 32–36)
MCV RBC AUTO: 97.2 FL — SIGNIFICANT CHANGE UP (ref 80–100)
PLATELET # BLD AUTO: 199 K/UL — SIGNIFICANT CHANGE UP (ref 150–400)
POTASSIUM SERPL-MCNC: 3.8 MMOL/L — SIGNIFICANT CHANGE UP (ref 3.5–5.3)
POTASSIUM SERPL-SCNC: 3.8 MMOL/L — SIGNIFICANT CHANGE UP (ref 3.5–5.3)
RBC # BLD: 4.65 M/UL — SIGNIFICANT CHANGE UP (ref 3.8–5.2)
RBC # FLD: 14.1 % — SIGNIFICANT CHANGE UP (ref 10.3–14.5)
SARS-COV-2 RNA SPEC QL NAA+PROBE: SIGNIFICANT CHANGE UP
SODIUM SERPL-SCNC: 140 MMOL/L — SIGNIFICANT CHANGE UP (ref 135–145)
WBC # BLD: 8.9 K/UL — SIGNIFICANT CHANGE UP (ref 3.8–10.5)
WBC # FLD AUTO: 8.9 K/UL — SIGNIFICANT CHANGE UP (ref 3.8–10.5)

## 2022-04-13 PROCEDURE — 99233 SBSQ HOSP IP/OBS HIGH 50: CPT | Mod: GC

## 2022-04-13 RX ADMIN — ENOXAPARIN SODIUM 40 MILLIGRAM(S): 100 INJECTION SUBCUTANEOUS at 14:46

## 2022-04-13 RX ADMIN — AMLODIPINE BESYLATE 5 MILLIGRAM(S): 2.5 TABLET ORAL at 09:35

## 2022-04-13 RX ADMIN — ATENOLOL 25 MILLIGRAM(S): 25 TABLET ORAL at 09:34

## 2022-04-13 NOTE — PROGRESS NOTE ADULT - SUBJECTIVE AND OBJECTIVE BOX
HPI:  89F with PMHx LBBB, HTN, SSS, 3rd degree heart block s/p PPM presents for generalized weakness and worsening ambulating x3 weeks. Pt states aide rang her doorbell today but was too weak to get to the door. Aide eventually opened the door herself and found pt to be looking pale and shaking all over. Pt denies LOC and remembers the whole event. Pt states her legs have gotten progressively to the point she feels like she is shuffling her feet with her walker. Pt states she has associated chills, hip stiffness and left shoulder stiffness. Pt also reports she was recently started on a diuretic a week ago for swelling in her legs that has been going on for months but also have gotten progressively worse over the past few weeks. Denies CP, SOB, orthopnea, cough, N/V/D, fevers, dysuria.     SUBJECTIVE  Pt is evaluated at bedside and found NAD. Pt reports had difficulty walking with PT one day ago. She reports feel well and denies any acute complaints. No back pain, leg numbness or tingling, SOB chest pain reported.     MEDICATIONS  (STANDING):  amLODIPine   Tablet 5 milliGRAM(s) Oral daily  ATENolol  Tablet 25 milliGRAM(s) Oral daily  enoxaparin Injectable 40 milliGRAM(s) SubCutaneous every 24 hours    MEDICATIONS  (PRN):  acetaminophen     Tablet .. 650 milliGRAM(s) Oral every 6 hours PRN Temp greater or equal to 38C (100.4F), Mild Pain (1 - 3)  aluminum hydroxide/magnesium hydroxide/simethicone Suspension 30 milliLiter(s) Oral every 4 hours PRN Dyspepsia  melatonin 3 milliGRAM(s) Oral at bedtime PRN Insomnia  ondansetron Injectable 4 milliGRAM(s) IV Push every 8 hours PRN Nausea and/or Vomiting      Vital Signs Last 24 Hrs  T(C): 36.6 (12 Apr 2022 15:18), Max: 36.6 (12 Apr 2022 15:18)  T(F): 97.9 (12 Apr 2022 15:18), Max: 97.9 (12 Apr 2022 15:18)  HR: 62 (12 Apr 2022 15:18) (60 - 62)  BP: 110/52 (12 Apr 2022 15:18) (109/48 - 110/52)  BP(mean): --  RR: 18 (12 Apr 2022 15:18) (17 - 18)  SpO2: 96% (12 Apr 2022 15:18) (95% - 96%)    Physical Exam   Gen: NAD, comfortable  HENT: atraumatic head and ears, no gross abnormalities of ears, mucous membranes moist, no oral lesions, neck supple without masses/goiter/lymphadenopathy  CV: RRR, nl s1/s2, no M/R/G  Pulm: nl respiratory effort, CTAB, no wheezes/crackles/rhonchi  Abd: normoactive bowel sounds in all 4 quadrants, soft, nontender, nondistended, no rebound, no guarding, no masses  Extremities: no pedal edema, pedal pulses palpable. L knee with effusion, no redness.    Skin: bilateral leg erythematous and scaly plaques consistent with stasis dermatitis   Neuro: A&Ox3, answering questions appropriately, PERRL, EOMI, face symmetric, strength 4/5 lower extremities       LABS:                          16.0   8.48  )-----------( 209      ( 12 Apr 2022 08:17 )             47.9     12 Apr 2022 08:17    139    |  107    |  16     ----------------------------<  176    4.0     |  29     |  0.56     Ca    8.9        12 Apr 2022 08:17    CAPILLARY BLOOD GLUCOSE      RADIOLOGY:    < from: CT Head No Cont (04.08.22 @ 14:20) >  INTERPRETATION:  Clinical Indication: Weakness    5mm axial sections of the brain were obtained from base to vertex,   without the intravenous administration of contrast material. Coronal and   sagittal computer generated reconstructed views are available.    Comparison is made with the prior CT of 6/24/2017 and demonstrates no   significant interval change.    Ventricles and sulci are prominent consistent with moderate atrophy. No   acute infarcts or hemorrhage. No change since the prior exam. Bone window   examination is unremarkable.    IMPRESSION: Moderate atrophy. No hemorrhage. No change since 6/24/2017.

## 2022-04-13 NOTE — PROGRESS NOTE ADULT - ASSESSMENT
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 Pt admitted due to:     #Positive COVID Test  - Isolation precautions  - Continue to monitor vitals, clinical status  - PT recommends DC to [facility] after negative COVID test    #Generalized Weakness 2/2 elderly debility (Improved)  - Pt also was on Aldactone before but w/o electrolytes disturbance since admission    -Currently no complaints of weakness  - UA negative   -CXR at admission showed no evidence of consolidations on personal review  -CT Head at admission showed moderate atrophy. No evidence of hemorrhage. No change since 6/2017  -TSH, Creatine Kinase, ESR WNL  -Trops 38.38  -PT recommended [facility]. Waiting for placement, however + COVID test      #Difficulty walking  #Left Knee Effusion  - Could be 2/2 to OA   -L Knee XR showed large supratellar effusion and narrowing of the medial compartment   - L Knee US showed left knee effusion and baker cyst   - No back pain reported  - No neurological deficit     #Bilateral LE edema  -Improving   -Likely 2/2 venous insufficiency   -Albumin 2.8  -TTE showed LVEF 55%    #Polycythemia   - Mild   - Hg 16, Hct 47.9 on admission  - Consider outpatient workup     #HTN  - BP in the lower side   -Orthostatic negative   -Atenolol now at decreased dose of 25 mg PO daily   -Cont Norvasc 2.5mg PO QD    #Pre DM   - A1c 6.4%   - Monitor BG      #DVT PPX  - Lovenox     #DNR/DNI - prior MOLST    Dispo: Placement to [facility] after negative COVID test    Case discussed with Dr Sharpe        89F with PMHx LBBB, HTN, SSS, 3rd degree heart block s/p PPM presents for generalized weakness and worsening ambulating x3 weeks. Pt admitted due to:     #Positive COVID Test  - Pt tested + for COVID on 4/13; although subsequent test is negative, given pt's pallor, tremors, and generalized weakness, pt is still under high clinical suspicion for resolving COVID, with second test a likely false negative  - Isolation precautions  - Continue to monitor vitals, clinical status  - PT recommends DC to [facility]; this will be pending at least 5 days of isolation  - Family contacted; will contact PCP in the AM    #Generalized Weakness 2/2 COVID infection in the setting of elderly debility  (Improved)  - Pt also was on Aldactone before but w/o electrolytes disturbance since admission    -Currently no complaints of weakness  - UA negative   -CXR at admission showed no evidence of consolidations on personal review  -CT Head at admission showed moderate atrophy. No evidence of hemorrhage. No change since 6/2017  -TSH, Creatine Kinase, ESR WNL  -Trops 38.38  -PT recommended [facility]. Waiting for placement, however + COVID test      #Difficulty walking  #Left Knee Effusion  - Could be 2/2 to OA   -L Knee XR showed large supratellar effusion and narrowing of the medial compartment   - L Knee US showed left knee effusion and baker cyst   - No back pain reported  - No neurological deficit     #Bilateral LE edema  -Improving   -Likely 2/2 venous insufficiency   -Albumin 2.8  -TTE showed LVEF 55%    #Polycythemia   - Mild   - Hg 16, Hct 47.9 on admission  - Consider outpatient workup     #HTN  - BP in the lower side   -Orthostatic negative   -Atenolol now at decreased dose of 25 mg PO daily   -Cont Norvasc 2.5mg PO QD    #Pre DM   - A1c 6.4%   - Monitor BG      #DVT PPX  - Lovenox     #DNR/DNI - prior MOLST    Dispo: Placement to [facility] after COVID isolation    Case discussed with Dr Sharpe

## 2022-04-13 NOTE — PROGRESS NOTE ADULT - ATTENDING COMMENTS
she tested positive for covid when checked for transfer to [facility] ([place]  no resp or GI sx's  lungs clear ant    imp- this may have been a false negative upon admission and thus the reason for her presentation.  to make sure this is not a false positive, we will retest on the cephead, if indeed positive will then reassess for Monoclonal ab's vs. paxlovid

## 2022-04-14 LAB
ANION GAP SERPL CALC-SCNC: 5 MMOL/L — SIGNIFICANT CHANGE UP (ref 5–17)
BUN SERPL-MCNC: 16 MG/DL — SIGNIFICANT CHANGE UP (ref 7–23)
CALCIUM SERPL-MCNC: 9 MG/DL — SIGNIFICANT CHANGE UP (ref 8.5–10.1)
CHLORIDE SERPL-SCNC: 103 MMOL/L — SIGNIFICANT CHANGE UP (ref 96–108)
CO2 SERPL-SCNC: 30 MMOL/L — SIGNIFICANT CHANGE UP (ref 22–31)
CREAT SERPL-MCNC: 0.54 MG/DL — SIGNIFICANT CHANGE UP (ref 0.5–1.3)
EGFR: 87 ML/MIN/1.73M2 — SIGNIFICANT CHANGE UP
GLUCOSE SERPL-MCNC: 121 MG/DL — HIGH (ref 70–99)
HCT VFR BLD CALC: 49 % — HIGH (ref 34.5–45)
HGB BLD-MCNC: 16.1 G/DL — HIGH (ref 11.5–15.5)
MCHC RBC-ENTMCNC: 31.8 PG — SIGNIFICANT CHANGE UP (ref 27–34)
MCHC RBC-ENTMCNC: 32.9 GM/DL — SIGNIFICANT CHANGE UP (ref 32–36)
MCV RBC AUTO: 96.6 FL — SIGNIFICANT CHANGE UP (ref 80–100)
PLATELET # BLD AUTO: 211 K/UL — SIGNIFICANT CHANGE UP (ref 150–400)
POTASSIUM SERPL-MCNC: 3.8 MMOL/L — SIGNIFICANT CHANGE UP (ref 3.5–5.3)
POTASSIUM SERPL-SCNC: 3.8 MMOL/L — SIGNIFICANT CHANGE UP (ref 3.5–5.3)
RBC # BLD: 5.07 M/UL — SIGNIFICANT CHANGE UP (ref 3.8–5.2)
RBC # FLD: 14.2 % — SIGNIFICANT CHANGE UP (ref 10.3–14.5)
SODIUM SERPL-SCNC: 138 MMOL/L — SIGNIFICANT CHANGE UP (ref 135–145)
WBC # BLD: 9.8 K/UL — SIGNIFICANT CHANGE UP (ref 3.8–10.5)
WBC # FLD AUTO: 9.8 K/UL — SIGNIFICANT CHANGE UP (ref 3.8–10.5)

## 2022-04-14 PROCEDURE — 99232 SBSQ HOSP IP/OBS MODERATE 35: CPT | Mod: GC

## 2022-04-14 RX ADMIN — AMLODIPINE BESYLATE 5 MILLIGRAM(S): 2.5 TABLET ORAL at 10:38

## 2022-04-14 RX ADMIN — ATENOLOL 25 MILLIGRAM(S): 25 TABLET ORAL at 10:38

## 2022-04-14 RX ADMIN — ENOXAPARIN SODIUM 40 MILLIGRAM(S): 100 INJECTION SUBCUTANEOUS at 12:46

## 2022-04-14 NOTE — PROGRESS NOTE ADULT - SUBJECTIVE AND OBJECTIVE BOX
HPI:  89F with PMHx LBBB, HTN, SSS, 3rd degree heart block s/p PPM presents for generalized weakness and worsening ambulating x3 weeks. Pt states aide rang her doorbell today but was too weak to get to the door. Aide eventually opened the door herself and found pt to be looking pale and shaking all over. Pt denies LOC and remembers the whole event. Pt states her legs have gotten progressively to the point she feels like she is shuffling her feet with her walker. Pt states she has associated chills, hip stiffness and left shoulder stiffness. Pt also reports she was recently started on a diuretic a week ago for swelling in her legs that has been going on for months but also have gotten progressively worse over the past few weeks. Denies CP, SOB, orthopnea, cough, N/V/D, fevers, dysuria.     SUBJECTIVE  Pt is evaluated at bedside and found NAD. Pt feels well today, slightly tired. She denies any acute complaints. No back pain, leg numbness or tingling, SOB chest pain reported. ROS as stated above    Vital Signs Last 24 Hrs  T(C): 37.3 (14 Apr 2022 08:31), Max: 37.3 (14 Apr 2022 08:31)  T(F): 99.1 (14 Apr 2022 08:31), Max: 99.1 (14 Apr 2022 08:31)  HR: 60 (14 Apr 2022 08:31) (59 - 60)  BP: 119/45 (14 Apr 2022 08:31) (119/45 - 124/41)  BP(mean): --  RR: 18 (14 Apr 2022 08:31) (17 - 18)  SpO2: 95% (14 Apr 2022 08:31) (95% - 95%)    Physical Exam   Gen: NAD, comfortable  HENT: atraumatic head and ears, no gross abnormalities of ears, mucous membranes moist, no oral lesions, neck supple without masses/goiter/lymphadenopathy  CV: RRR, nl s1/s2, no M/R/G  Pulm: nl respiratory effort, CTAB, no wheezes/crackles/rhonchi  Abd: normoactive bowel sounds in all 4 quadrants, soft, nontender, nondistended, no rebound, no guarding, no masses  Extremities: no pedal edema, pedal pulses palpable. L knee with effusion, no redness.    Skin: bilateral leg erythematous and scaly plaques consistent with stasis dermatitis   Neuro: A&Ox3, answering questions appropriately, PERRL, EOMI, face symmetric, strength 4/5 lower extremities       MEDICATIONS:  MEDICATIONS  (STANDING):  amLODIPine   Tablet 5 milliGRAM(s) Oral daily  ATENolol  Tablet 25 milliGRAM(s) Oral daily  enoxaparin Injectable 40 milliGRAM(s) SubCutaneous every 24 hours    MEDICATIONS  (PRN):  acetaminophen     Tablet .. 650 milliGRAM(s) Oral every 6 hours PRN Temp greater or equal to 38C (100.4F), Mild Pain (1 - 3)  aluminum hydroxide/magnesium hydroxide/simethicone Suspension 30 milliLiter(s) Oral every 4 hours PRN Dyspepsia  melatonin 3 milliGRAM(s) Oral at bedtime PRN Insomnia  ondansetron Injectable 4 milliGRAM(s) IV Push every 8 hours PRN Nausea and/or Vomiting      LABS: All Labs Reviewed:                        16.1   9.80  )-----------( 211      ( 14 Apr 2022 08:38 )             49.0     04-14    138  |  103  |  16  ----------------------------<  121<H>  3.8   |  30  |  0.54    Ca    9.0      14 Apr 2022 08:38            Blood Culture:   I&O's Summary    13 Apr 2022 07:01  -  14 Apr 2022 07:00  --------------------------------------------------------  IN: 0 mL / OUT: 50 mL / NET: -50 mL      CAPILLARY BLOOD GLUCOSE            RADIOLOGY:    < from: CT Head No Cont (04.08.22 @ 14:20) >  INTERPRETATION:  Clinical Indication: Weakness    5mm axial sections of the brain were obtained from base to vertex,   without the intravenous administration of contrast material. Coronal and   sagittal computer generated reconstructed views are available.    Comparison is made with the prior CT of 6/24/2017 and demonstrates no   significant interval change.    Ventricles and sulci are prominent consistent with moderate atrophy. No   acute infarcts or hemorrhage. No change since the prior exam. Bone window   examination is unremarkable.    IMPRESSION: Moderate atrophy. No hemorrhage. No change since 6/24/2017.

## 2022-04-14 NOTE — PROGRESS NOTE ADULT - ATTENDING COMMENTS
no complaints.  Lungs clear ant    imp- likely a true positive. her age may make her a candidate for MAB or paxlovid  recheck Hgb- ?hemoconcentration

## 2022-04-14 NOTE — PROGRESS NOTE ADULT - ASSESSMENT
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 Pt admitted due to:     #Positive COVID Test  - Pt tested + for COVID on 4/13; although subsequent test is negative, given pt's pallor, tremors, and generalized weakness, pt is still under high clinical suspicion for resolving COVID, with second test a likely false negative  - Isolation precautions  - Continue to monitor vitals, clinical status  - PT recommends DC to [facility]; this will be pending at least 5 days of isolation  - Family contacted; will contact PCP today    #Generalized Weakness 2/2 COVID infection in the setting of elderly debility  (Improved)  - Pt also was on Aldactone before but w/o electrolytes disturbance since admission    -Currently no complaints of weakness  - UA negative   -CXR at admission showed no evidence of consolidations on personal review  -CT Head at admission showed moderate atrophy. No evidence of hemorrhage. No change since 6/2017  -TSH, Creatine Kinase, ESR WNL  -Trops 38.38  -PT recommended [facility]. Waiting for placement, however + COVID test      #Difficulty walking  #Left Knee Effusion  - Could be 2/2 to OA   -L Knee XR showed large supratellar effusion and narrowing of the medial compartment   - L Knee US showed left knee effusion and baker cyst   - No back pain reported  - No neurological deficit     #Bilateral LE edema  -Improving   -Likely 2/2 venous insufficiency   -Albumin 2.8  -TTE showed LVEF 55%    #Polycythemia   - Mild   - Hg 16, Hct 47.9 on admission  - Consider outpatient workup     #HTN  - On the floor, BP in the lower side, now improved  -Orthostatic negative   -Atenolol now at decreased dose of 25 mg PO daily   -Cont Norvasc 2.5mg PO QD    #Pre DM   - A1c 6.4%   - Monitor BG      #DVT PPX  - Lovenox     #DNR/DNI - prior MOLST    Dispo: Placement to [facility] after COVID isolation    Case discussed with Dr Sharpe

## 2022-04-15 ENCOUNTER — TRANSCRIPTION ENCOUNTER (OUTPATIENT)
Age: 87
End: 2022-04-15

## 2022-04-15 VITALS
RESPIRATION RATE: 18 BRPM | HEART RATE: 60 BPM | TEMPERATURE: 98 F | OXYGEN SATURATION: 97 % | DIASTOLIC BLOOD PRESSURE: 57 MMHG | SYSTOLIC BLOOD PRESSURE: 117 MMHG

## 2022-04-15 LAB
ANION GAP SERPL CALC-SCNC: 4 MMOL/L — LOW (ref 5–17)
BUN SERPL-MCNC: 13 MG/DL — SIGNIFICANT CHANGE UP (ref 7–23)
CALCIUM SERPL-MCNC: 8.7 MG/DL — SIGNIFICANT CHANGE UP (ref 8.5–10.1)
CHLORIDE SERPL-SCNC: 104 MMOL/L — SIGNIFICANT CHANGE UP (ref 96–108)
CO2 SERPL-SCNC: 29 MMOL/L — SIGNIFICANT CHANGE UP (ref 22–31)
CREAT SERPL-MCNC: 0.56 MG/DL — SIGNIFICANT CHANGE UP (ref 0.5–1.3)
EGFR: 87 ML/MIN/1.73M2 — SIGNIFICANT CHANGE UP
GLUCOSE SERPL-MCNC: 178 MG/DL — HIGH (ref 70–99)
HCT VFR BLD CALC: 44.9 % — SIGNIFICANT CHANGE UP (ref 34.5–45)
HGB BLD-MCNC: 15.3 G/DL — SIGNIFICANT CHANGE UP (ref 11.5–15.5)
MCHC RBC-ENTMCNC: 32.1 PG — SIGNIFICANT CHANGE UP (ref 27–34)
MCHC RBC-ENTMCNC: 34.1 GM/DL — SIGNIFICANT CHANGE UP (ref 32–36)
MCV RBC AUTO: 94.1 FL — SIGNIFICANT CHANGE UP (ref 80–100)
PLATELET # BLD AUTO: 175 K/UL — SIGNIFICANT CHANGE UP (ref 150–400)
POTASSIUM SERPL-MCNC: 4.2 MMOL/L — SIGNIFICANT CHANGE UP (ref 3.5–5.3)
POTASSIUM SERPL-SCNC: 4.2 MMOL/L — SIGNIFICANT CHANGE UP (ref 3.5–5.3)
RBC # BLD: 4.77 M/UL — SIGNIFICANT CHANGE UP (ref 3.8–5.2)
RBC # FLD: 13.9 % — SIGNIFICANT CHANGE UP (ref 10.3–14.5)
SODIUM SERPL-SCNC: 137 MMOL/L — SIGNIFICANT CHANGE UP (ref 135–145)
WBC # BLD: 9.42 K/UL — SIGNIFICANT CHANGE UP (ref 3.8–10.5)
WBC # FLD AUTO: 9.42 K/UL — SIGNIFICANT CHANGE UP (ref 3.8–10.5)

## 2022-04-15 PROCEDURE — 99239 HOSP IP/OBS DSCHRG MGMT >30: CPT

## 2022-04-15 RX ORDER — LANOLIN ALCOHOL/MO/W.PET/CERES
1 CREAM (GRAM) TOPICAL
Qty: 0 | Refills: 0 | DISCHARGE
Start: 2022-04-15

## 2022-04-15 RX ORDER — AMLODIPINE BESYLATE 2.5 MG/1
1 TABLET ORAL
Qty: 0 | Refills: 0 | DISCHARGE

## 2022-04-15 RX ORDER — SPIRONOLACTONE 25 MG/1
1 TABLET, FILM COATED ORAL
Qty: 0 | Refills: 0 | DISCHARGE

## 2022-04-15 RX ORDER — AMLODIPINE BESYLATE 2.5 MG/1
1 TABLET ORAL
Qty: 0 | Refills: 0 | DISCHARGE
Start: 2022-04-15

## 2022-04-15 RX ORDER — ACETAMINOPHEN 500 MG
2 TABLET ORAL
Qty: 0 | Refills: 0 | DISCHARGE
Start: 2022-04-15

## 2022-04-15 RX ORDER — ATENOLOL 25 MG/1
1 TABLET ORAL
Qty: 0 | Refills: 0 | DISCHARGE
Start: 2022-04-15

## 2022-04-15 RX ADMIN — ATENOLOL 25 MILLIGRAM(S): 25 TABLET ORAL at 10:26

## 2022-04-15 RX ADMIN — ENOXAPARIN SODIUM 40 MILLIGRAM(S): 100 INJECTION SUBCUTANEOUS at 14:18

## 2022-04-15 RX ADMIN — AMLODIPINE BESYLATE 5 MILLIGRAM(S): 2.5 TABLET ORAL at 10:26

## 2022-04-15 NOTE — DISCHARGE NOTE PROVIDER - CARE PROVIDERS DIRECT ADDRESSES
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

## 2022-04-15 NOTE — PROGRESS NOTE ADULT - REASON FOR ADMISSION
generalized weakness

## 2022-04-15 NOTE — DISCHARGE NOTE PROVIDER - NSDCCPCAREPLAN_GEN_ALL_CORE_FT
PRINCIPAL DISCHARGE DIAGNOSIS  Diagnosis: Weakness  Assessment and Plan of Treatment: You came here with generalized weakness. In the emergency room, you did have hgh blood pressure. Labs were largely unremarkable. w/ negative COVID testing. Head CT imaging only showed mild atrophy. Chest X-ray and X-ray hip + pelvis showed no acute processes. X-ray knee showed no fracture but left knee effusion and Your Ultrasound duplex imaging of the legs showed no DVT but left sided Baker's cyst. You were given home medications but we stopped your aldactone and you were admitted for further evaluation. Physical therapy worked with you. Your heart echo was reasonable, showing ejection fraction at 55% with only mildly dilated LV/LA and moderate MR/TR. You did have some low blood pressure, so we decreased your dosage of atenolol. Your stay was complicated by a positive COVID test, and you were put in isolation. Repeat COVID test was negative. Today you are feeling better, with improved symptoms and less weakness. You will be discharged to rehab on today, April 15.  - Please take your medications as indicated. Don't take aldactone anymore, and please note that we reduced the dosage of your atenlol  - Please follow up with Dr. Pushpa VELÁZQUEZ for further medical management.  Thank you!

## 2022-04-15 NOTE — DISCHARGE NOTE NURSING/CASE MANAGEMENT/SOCIAL WORK - NSDCPEFALRISK_GEN_ALL_CORE
For information on Fall & Injury Prevention, visit: [email or web address]  [email or web address] OR  https://www.cdc.gov/steadi/patient.html

## 2022-04-15 NOTE — PROGRESS NOTE ADULT - ASSESSMENT
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 Pt admitted due to:     #Positive COVID Test  - Pt tested + for COVID on 4/13; although subsequent test is negative, given pt's pallor, tremors, and generalized weakness, pt is still under high clinical suspicion for resolving COVID, with second test a likely false negative  - Isolation precautions  - Continue to monitor vitals, clinical status  - PT recommends DC to [facility]; this will be pending at least 5 days of isolation  - Family contacted; PCP    #Generalized Weakness 2/2 COVID infection in the setting of elderly debility  (Improved)  - Pt also was on Aldactone before but w/o electrolytes disturbance since admission    -Currently no complaints of weakness  - UA negative   -CXR at admission showed no evidence of consolidations on personal review  -CT Head at admission showed moderate atrophy. No evidence of hemorrhage. No change since 6/2017  -TSH, Creatine Kinase, ESR WNL  -Trops 38.38  -PT recommended [facility]. Waiting for placement, however + COVID test      #Difficulty walking  #Left Knee Effusion  - Could be 2/2 to OA   -L Knee XR showed large supratellar effusion and narrowing of the medial compartment   - L Knee US showed left knee effusion and baker cyst   - No back pain reported  - No neurological deficit     #Bilateral LE edema  -Improving   -Likely 2/2 venous insufficiency   -Albumin 2.8  -TTE showed LVEF 55%    #Polycythemia   - Mild   - Hg 16, Hct 47.9 on admission  - Consider outpatient workup     #HTN  - On the floor, BP in the lower side, now improved  -Orthostatic negative   -Atenolol now at decreased dose of 25 mg PO daily   -Cont Norvasc 2.5mg PO QD    #Pre DM   - A1c 6.4%   - Monitor BG      #DVT PPX  - Lovenox     #DNR/DNI - prior MOLST    Dispo: Placement to [facility] after COVID isolation    Case discussed with Dr Sharpe

## 2022-04-15 NOTE — PROGRESS NOTE ADULT - ATTENDING COMMENTS
this pleasant elderly woman came in with weakness most likely from initiation of diuretic therapy.  she is quite frail and required transfer to rehab.  she has been vaccinated for covid and actually had covid 3 months ago.  her admitting covid PCR was negative, a repeat prior to transfer was positive, but clinically this did not fit.  we did a 3rd PCR test which was negative. subsequent lab investigation revealed that the specimen run before her positive test was a positive and that usual sterile procedures to avoid cross contamination had been breached.    thus we are confident this is a false positive and she may be transferred to subacute rehab.

## 2022-04-15 NOTE — DISCHARGE NOTE PROVIDER - HOSPITAL COURSE
Pt is a 89 yo F with PMHx LBBB, HTN, SSS, 3rd degree heart block s/p PPM presents for generalized weakness. Pt has had increased generalized weakness that culminated on day of admission, when she was too weak to stand, no alleviating/aggravating factors but possibly associated with pallor, tremors, chills, and difficulty ambulating. In the ED, pt was found to be afebrile but hypertensive. Labs were largely unremarkable. w/ negative COVID testing. Head CT only showed mild atrophy. CXR and XR hip + pelvis showed no acute processes. XR knee showed no fracture but left knee effusion and US duplex LE showed no DVT but left sided Baker's cyst. Pt was started on home medications except aldactone which was held and was admitted for further evaluation. Physical therapy worked with pt on her debility. TTE was performed showing EF at 55% with only mildly dilated LV/LA and moderate MR/TR. Pt did have some hypotensive readings and atenolol was decreased. Her stay was complicated by a + COVID test, and pt was put in isolation until test was determined to be a false positive. Repeat COVID test was negative. Today pt is feeling better, with improved sxs and less weakness. She will be discharged to [facility] on today, April 15, for outpatient follow-up with her primary care provider, Dr. Barrow.    SUBJECTIVE  No overnight events. Pt is resting comfortably in bed with no complaints. She denies any acute symptoms. No back pain, leg numbness or tingling, SOB chest pain reported. ROS as stated above    Vital Signs Last 24 Hrs  T(C): 36.8 (15 Apr 2022 07:49), Max: 37.2 (14 Apr 2022 15:27)  T(F): 98.2 (15 Apr 2022 07:49), Max: 98.9 (14 Apr 2022 15:27)  HR: 60 (15 Apr 2022 07:49) (60 - 60)  BP: 123/57 (15 Apr 2022 07:49) (118/48 - 123/57)  BP(mean): --  RR: 18 (15 Apr 2022 07:49) (18 - 18)  SpO2: 96% (15 Apr 2022 07:49) (94% - 96%)    Physical Exam   Gen: NAD, comfortable  HENT: atraumatic head and ears, no gross abnormalities of ears, mucous membranes moist, no oral lesions, neck supple without masses/goiter/lymphadenopathy  CV: RRR, nl s1/s2, no M/R/G  Pulm: nl respiratory effort, CTAB, no wheezes/crackles/rhonchi  Abd: normoactive bowel sounds in all 4 quadrants, soft, nontender, nondistended, no rebound, no guarding, no masses  Extremities: no pedal edema, pedal pulses palpable. L knee with effusion, no redness.    Skin: bilateral leg erythematous and scaly plaques consistent with stasis dermatitis   Neuro: A&Ox3, answering questions appropriately, PERRL, EOMI, face symmetric, strength 4/5 lower extremities     EKG/RADIOLOGY    < from: TTE Echo Complete w/o Contrast w/ Doppler (04.09.22 @ 10:58) >   Summary   The mitral valve leaflets appear thickened.   Moderate (2+) mitral regurgitation is present.   The aortic valve is visualized, appears mildly sclerotic. Valve opening   seems to be normal.   Moderate (2+) tricuspid valve regurgitation is present.   The left atrium is mildly dilated.   The left ventricle cavity is mildly dilated.   Estimated left ventricular ejection fraction is 55 %.   Normal appearing right atrium.   A device wire is seen in the RV and RA.    < from: US Duplex Venous Lower Ext Ltd, Left (04.09.22 @ 10:40) >    IMPRESSION:  No evidence of left lower extremity deep venous thrombosis.  Left knee joint effusion and left Baker's cyst.    < from: Xray Knee 1 or 2 Views, Left (04.08.22 @ 22:06) >  FINDINGS: The bone mineralization is normal.  There is no fracture or dislocation.  Degenerative narrowing of the medial and lateral joint compartments.   Large suprapatellar joint effusion..   No gross soft tissue/abnormalities..    IMPRESSION:  Degenerative narrowing of themedial lateral joint compartments.  Large suprapatellar joint effusion.  No fracture..    < from: Xray Hip w/ Pelvis 2 Views, Bilateral (04.08.22 @ 22:05) >  FINDINGS: Evaluation demonstrates no acute fracture or dislocation.  Osseous pelvis intact.  The SI joints and symphysis pubis are within normal limits..  IMPRESSION:  No acute radiographic osseous pathology..    < from: Xray Chest 1 View- PORTABLE-Urgent (Xray Chest 1 View- PORTABLE-Urgent .) (04.08.22 @ 14:34) >  Findings/Impression:  Hyperinflation suggests emphysema  No focal consolidation.  No large effusions or pneumothoraces  Cardiomediastinal silhouette is within normal limits.  Osseous structures are unremarkable for age.    < from: CT Head No Cont (04.08.22 @ 14:20) >  INTERPRETATION:  Clinical Indication: Weakness  5mm axial sections of the brain were obtained from base to vertex,   without the intravenous administration of contrast material. Coronal and   sagittal computer generated reconstructed views are available.  Comparison is made with the prior CT of 6/24/2017 and demonstrates no   significant interval change.  Ventricles and sulci are prominent consistent with moderate atrophy. No   acute infarcts or hemorrhage. No change since the prior exam. Bone window   examination is unremarkable.  IMPRESSION: Moderate atrophy. No hemorrhage. No change since 6/24/2017.                     Pt is a 89 yo F with PMHx LBBB, HTN, SSS, 3rd degree heart block s/p PPM presents for generalized weakness. Pt has had increased generalized weakness that culminated on day of admission, when she was too weak to stand, no alleviating/aggravating factors but possibly associated with pallor, tremors, chills, and difficulty ambulating. In the ED, pt was found to be afebrile but hypertensive. Labs were largely unremarkable. w/ negative COVID testing. Head CT only showed mild atrophy. CXR and XR hip + pelvis showed no acute processes. XR knee showed no fracture but left knee effusion and US duplex LE showed no DVT but left sided Baker's cyst. Pt was started on home medications except aldactone which was held and was admitted for further evaluation. Physical therapy worked with pt on her debility. TTE was performed showing EF at 55% with only mildly dilated LV/LA and moderate MR/TR. Pt did have some hypotensive readings and atenolol was decreased. Her stay was complicated by a + COVID test, and pt was put in isolation until test was determined to be a false positive. Repeat COVID test was negative. Today pt is feeling better, with improved sxs and less weakness. She will be discharged to [facility] on today, April 15, for outpatient follow-up with her primary care provider, Dr. Barrow.    SUBJECTIVE  No overnight events. Pt is resting comfortably in bed with no complaints. She denies any acute symptoms. No back pain, leg numbness or tingling, SOB chest pain reported. ROS as stated above    Vital Signs Last 24 Hrs  T(C): 36.8 (15 Apr 2022 07:49), Max: 37.2 (14 Apr 2022 15:27)  T(F): 98.2 (15 Apr 2022 07:49), Max: 98.9 (14 Apr 2022 15:27)  HR: 60 (15 Apr 2022 07:49) (60 - 60)  BP: 123/57 (15 Apr 2022 07:49) (118/48 - 123/57)  BP(mean): --  RR: 18 (15 Apr 2022 07:49) (18 - 18)  SpO2: 96% (15 Apr 2022 07:49) (94% - 96%)    Physical Exam   Gen: NAD, comfortable  HENT: atraumatic head and ears, no gross abnormalities of ears, mucous membranes moist, no oral lesions, neck supple without masses/goiter/lymphadenopathy  CV: RRR, nl s1/s2, no M/R/G  Pulm: nl respiratory effort, CTAB, no wheezes/crackles/rhonchi  Abd: normoactive bowel sounds in all 4 quadrants, soft, nontender, nondistended, no rebound, no guarding, no masses  Extremities: no pedal edema, pedal pulses palpable. L knee with effusion, no redness.    Skin: bilateral leg erythematous and scaly plaques consistent with stasis dermatitis   Neuro: A&Ox3, answering questions appropriately, PERRL, EOMI, face symmetric, strength 4/5 lower extremities     EKG/RADIOLOGY    < from: TTE Echo Complete w/o Contrast w/ Doppler (04.09.22 @ 10:58) >   Summary   The mitral valve leaflets appear thickened.   Moderate (2+) mitral regurgitation is present.   The aortic valve is visualized, appears mildly sclerotic. Valve opening   seems to be normal.   Moderate (2+) tricuspid valve regurgitation is present.   The left atrium is mildly dilated.   The left ventricle cavity is mildly dilated.   Estimated left ventricular ejection fraction is 55 %.   Normal appearing right atrium.   A device wire is seen in the RV and RA.    < from: US Duplex Venous Lower Ext Ltd, Left (04.09.22 @ 10:40) >    IMPRESSION:  No evidence of left lower extremity deep venous thrombosis.  Left knee joint effusion and left Baker's cyst.    < from: Xray Knee 1 or 2 Views, Left (04.08.22 @ 22:06) >  FINDINGS: The bone mineralization is normal.  There is no fracture or dislocation.  Degenerative narrowing of the medial and lateral joint compartments.   Large suprapatellar joint effusion..   No gross soft tissue/abnormalities..    IMPRESSION:  Degenerative narrowing of themedial lateral joint compartments.  Large suprapatellar joint effusion.  No fracture..    < from: Xray Hip w/ Pelvis 2 Views, Bilateral (04.08.22 @ 22:05) >  FINDINGS: Evaluation demonstrates no acute fracture or dislocation.  Osseous pelvis intact.  The SI joints and symphysis pubis are within normal limits..  IMPRESSION:  No acute radiographic osseous pathology..    < from: Xray Chest 1 View- PORTABLE-Urgent (Xray Chest 1 View- PORTABLE-Urgent .) (04.08.22 @ 14:34) >  Findings/Impression:  Hyperinflation suggests emphysema  No focal consolidation.  No large effusions or pneumothoraces  Cardiomediastinal silhouette is within normal limits.  Osseous structures are unremarkable for age.    < from: CT Head No Cont (04.08.22 @ 14:20) >  INTERPRETATION:  Clinical Indication: Weakness  5mm axial sections of the brain were obtained from base to vertex,   without the intravenous administration of contrast material. Coronal and   sagittal computer generated reconstructed views are available.  Comparison is made with the prior CT of 6/24/2017 and demonstrates no   significant interval change.  Ventricles and sulci are prominent consistent with moderate atrophy. No   acute infarcts or hemorrhage. No change since the prior exam. Bone window   examination is unremarkable.  IMPRESSION: Moderate atrophy. No hemorrhage. No change since 6/24/2017.    medicine attending- see my progress note from today  total time~33 min

## 2022-04-15 NOTE — DISCHARGE NOTE PROVIDER - CARE PROVIDER_API CALL
Charlene Hendrix)  Internal Medicine  [address] [place] [address]  Phone: [phone]  Fax: [phone]  Established Patient  Follow Up Time:

## 2022-04-15 NOTE — PROGRESS NOTE ADULT - PROVIDER SPECIALTY LIST ADULT
Family Medicine
Hospitalist
Family Medicine

## 2022-04-15 NOTE — DISCHARGE NOTE PROVIDER - NSDCCAREPROVSEEN_GEN_ALL_CORE_FT
Jacques, Lanre Granado, Andreea Martin, Aidan Garcia, Zaheer Rivera, Faisal Sharpe, Mik Clayton, Daniel

## 2022-04-15 NOTE — PROGRESS NOTE ADULT - SUBJECTIVE AND OBJECTIVE BOX
HPI:  89F with PMHx LBBB, HTN, SSS, 3rd degree heart block s/p PPM presents for generalized weakness and worsening ambulating x3 weeks. Pt states aide rang her doorbell today but was too weak to get to the door. Aide eventually opened the door herself and found pt to be looking pale and shaking all over. Pt denies LOC and remembers the whole event. Pt states her legs have gotten progressively to the point she feels like she is shuffling her feet with her walker. Pt states she has associated chills, hip stiffness and left shoulder stiffness. Pt also reports she was recently started on a diuretic a week ago for swelling in her legs that has been going on for months but also have gotten progressively worse over the past few weeks. Denies CP, SOB, orthopnea, cough, N/V/D, fevers, dysuria.     SUBJECTIVE  No overnight events. Pt is resting comfortably in bed with no complaints. She denies any acute symptoms. No back pain, leg numbness or tingling, SOB chest pain reported. ROS as stated above    Vital Signs Last 24 Hrs  Vital Signs Last 24 Hrs  T(C): 36.8 (15 Apr 2022 07:49), Max: 37.2 (14 Apr 2022 15:27)  T(F): 98.2 (15 Apr 2022 07:49), Max: 98.9 (14 Apr 2022 15:27)  HR: 60 (15 Apr 2022 07:49) (60 - 60)  BP: 123/57 (15 Apr 2022 07:49) (118/48 - 123/57)  BP(mean): --  RR: 18 (15 Apr 2022 07:49) (18 - 18)  SpO2: 96% (15 Apr 2022 07:49) (94% - 96%)    Physical Exam   Gen: NAD, comfortable  HENT: atraumatic head and ears, no gross abnormalities of ears, mucous membranes moist, no oral lesions, neck supple without masses/goiter/lymphadenopathy  CV: RRR, nl s1/s2, no M/R/G  Pulm: nl respiratory effort, CTAB, no wheezes/crackles/rhonchi  Abd: normoactive bowel sounds in all 4 quadrants, soft, nontender, nondistended, no rebound, no guarding, no masses  Extremities: no pedal edema, pedal pulses palpable. L knee with effusion, no redness.    Skin: bilateral leg erythematous and scaly plaques consistent with stasis dermatitis   Neuro: A&Ox3, answering questions appropriately, PERRL, EOMI, face symmetric, strength 4/5 lower extremities       MEDICATIONS:  MEDICATIONS  (STANDING):  amLODIPine   Tablet 5 milliGRAM(s) Oral daily  ATENolol  Tablet 25 milliGRAM(s) Oral daily  enoxaparin Injectable 40 milliGRAM(s) SubCutaneous every 24 hours    MEDICATIONS  (PRN):  acetaminophen     Tablet .. 650 milliGRAM(s) Oral every 6 hours PRN Temp greater or equal to 38C (100.4F), Mild Pain (1 - 3)  aluminum hydroxide/magnesium hydroxide/simethicone Suspension 30 milliLiter(s) Oral every 4 hours PRN Dyspepsia  melatonin 3 milliGRAM(s) Oral at bedtime PRN Insomnia  ondansetron Injectable 4 milliGRAM(s) IV Push every 8 hours PRN Nausea and/or Vomiting      LABS: All Labs Reviewed:                        16.1   9.80  )-----------( 211      ( 14 Apr 2022 08:38 )             49.0     04-14    138  |  103  |  16  ----------------------------<  121<H>  3.8   |  30  |  0.54    Ca    9.0      14 Apr 2022 08:38            Blood Culture:   I&O's Summary    CAPILLARY BLOOD GLUCOSE                RADIOLOGY:    < from: CT Head No Cont (04.08.22 @ 14:20) >  INTERPRETATION:  Clinical Indication: Weakness    5mm axial sections of the brain were obtained from base to vertex,   without the intravenous administration of contrast material. Coronal and   sagittal computer generated reconstructed views are available.    Comparison is made with the prior CT of 6/24/2017 and demonstrates no   significant interval change.    Ventricles and sulci are prominent consistent with moderate atrophy. No   acute infarcts or hemorrhage. No change since the prior exam. Bone window   examination is unremarkable.    IMPRESSION: Moderate atrophy. No hemorrhage. No change since 6/24/2017.

## 2022-04-15 NOTE — DISCHARGE NOTE NURSING/CASE MANAGEMENT/SOCIAL WORK - PATIENT PORTAL LINK FT
You can access the FollowMyHealth Patient Portal offered by [facility] by registering at the following website: [email or web address]. By joining [email or web address]’s FollowMyHealth portal, you will also be able to view your health information using other applications (apps) compatible with our system.

## 2022-04-22 DIAGNOSIS — T50.0X5A ADVERSE EFFECT OF MINERALOCORTICOIDS AND THEIR ANTAGONISTS, INITIAL ENCOUNTER: ICD-10-CM

## 2022-04-22 DIAGNOSIS — Z86.16 PERSONAL HISTORY OF COVID-19: ICD-10-CM

## 2022-04-22 DIAGNOSIS — R54 AGE-RELATED PHYSICAL DEBILITY: ICD-10-CM

## 2022-04-22 DIAGNOSIS — I95.9 HYPOTENSION, UNSPECIFIED: ICD-10-CM

## 2022-04-22 DIAGNOSIS — Z20.822 CONTACT WITH AND (SUSPECTED) EXPOSURE TO COVID-19: ICD-10-CM
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
## 2022-04-22 DIAGNOSIS — I08.1 RHEUMATIC DISORDERS OF BOTH MITRAL AND TRICUSPID VALVES: ICD-10-CM

## 2022-04-22 DIAGNOSIS — I44.7 LEFT BUNDLE-BRANCH BLOCK, UNSPECIFIED: ICD-10-CM

## 2022-04-22 DIAGNOSIS — M25.462 EFFUSION, LEFT KNEE: ICD-10-CM

## 2022-04-22 DIAGNOSIS — I44.2 ATRIOVENTRICULAR BLOCK, COMPLETE: ICD-10-CM

## 2022-04-22 DIAGNOSIS — Z88.0 ALLERGY STATUS TO PENICILLIN: ICD-10-CM
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
## 2022-04-22 DIAGNOSIS — Z95.0 PRESENCE OF CARDIAC PACEMAKER: ICD-10-CM

## 2022-04-22 DIAGNOSIS — Z66 DO NOT RESUSCITATE: ICD-10-CM

## 2022-04-22 DIAGNOSIS — D75.1 SECONDARY POLYCYTHEMIA: ICD-10-CM

## 2022-04-22 DIAGNOSIS — M16.0 BILATERAL PRIMARY OSTEOARTHRITIS OF HIP: ICD-10-CM

## 2022-04-22 DIAGNOSIS — R73.9 HYPERGLYCEMIA, UNSPECIFIED: ICD-10-CM

## 2022-04-22 DIAGNOSIS — R27.0 ATAXIA, UNSPECIFIED: ICD-10-CM

## 2022-04-22 DIAGNOSIS — M71.22 SYNOVIAL CYST OF POPLITEAL SPACE [BAKER], LEFT KNEE: ICD-10-CM

## 2022-04-22 DIAGNOSIS — Z88.8 ALLERGY STATUS TO OTHER DRUGS, MEDICAMENTS AND BIOLOGICAL SUBSTANCES STATUS: ICD-10-CM

## 2022-05-04 ENCOUNTER — APPOINTMENT (OUTPATIENT)
Dept: ELECTROPHYSIOLOGY | Facility: CLINIC | Age: 87
End: 2022-05-04

## 2024-10-29 NOTE — PHYSICAL THERAPY INITIAL EVALUATION ADULT - IMPAIRMENTS FOUND, PT EVAL
How Severe Is Your Skin Discoloration?: mild
Additional History: She thinks it may be a dark spot but would like confirmation.
aerobic capacity/endurance/gait, locomotion, and balance
